# Patient Record
Sex: FEMALE | Race: BLACK OR AFRICAN AMERICAN
[De-identification: names, ages, dates, MRNs, and addresses within clinical notes are randomized per-mention and may not be internally consistent; named-entity substitution may affect disease eponyms.]

---

## 2022-12-03 ENCOUNTER — HOSPITAL ENCOUNTER (EMERGENCY)
Dept: HOSPITAL 97 - ER | Age: 67
LOS: 1 days | Discharge: HOME | End: 2022-12-04
Payer: COMMERCIAL

## 2022-12-03 DIAGNOSIS — K80.50: ICD-10-CM

## 2022-12-03 DIAGNOSIS — I10: ICD-10-CM

## 2022-12-03 DIAGNOSIS — K57.30: Primary | ICD-10-CM

## 2022-12-03 DIAGNOSIS — Z88.6: ICD-10-CM

## 2022-12-03 LAB
ALBUMIN SERPL BCP-MCNC: 3.5 G/DL (ref 3.4–5)
ALP SERPL-CCNC: 91 U/L (ref 45–117)
ALT SERPL W P-5'-P-CCNC: 23 U/L (ref 12–78)
AST SERPL W P-5'-P-CCNC: 19 U/L (ref 15–37)
BUN BLD-MCNC: 12 MG/DL (ref 7–18)
GLUCOSE SERPLBLD-MCNC: 135 MG/DL (ref 74–106)
HCT VFR BLD CALC: 35.1 % (ref 36–45)
LIPASE SERPL-CCNC: 48 U/L (ref 73–393)
LYMPHOCYTES # SPEC AUTO: 0.8 K/UL (ref 0.7–4.9)
MCV RBC: 80.5 FL (ref 80–100)
PMV BLD: 8.6 FL (ref 7.6–11.3)
POTASSIUM SERPL-SCNC: 3.4 MMOL/L (ref 3.5–5.1)
RBC # BLD: 4.36 M/UL (ref 3.86–4.86)
TROPONIN I SERPL HS-MCNC: 14.6 PG/ML (ref ?–58.9)
TSH SERPL DL<=0.05 MIU/L-ACNC: 0.37 UIU/ML (ref 0.36–3.74)

## 2022-12-03 PROCEDURE — 99284 EMERGENCY DEPT VISIT MOD MDM: CPT

## 2022-12-03 PROCEDURE — 86850 RBC ANTIBODY SCREEN: CPT

## 2022-12-03 PROCEDURE — 96374 THER/PROPH/DIAG INJ IV PUSH: CPT

## 2022-12-03 PROCEDURE — 86901 BLOOD TYPING SEROLOGIC RH(D): CPT

## 2022-12-03 PROCEDURE — 86900 BLOOD TYPING SEROLOGIC ABO: CPT

## 2022-12-03 PROCEDURE — 87040 BLOOD CULTURE FOR BACTERIA: CPT

## 2022-12-03 PROCEDURE — 96372 THER/PROPH/DIAG INJ SC/IM: CPT

## 2022-12-03 PROCEDURE — 83690 ASSAY OF LIPASE: CPT

## 2022-12-03 PROCEDURE — 84484 ASSAY OF TROPONIN QUANT: CPT

## 2022-12-03 PROCEDURE — 93005 ELECTROCARDIOGRAM TRACING: CPT

## 2022-12-03 PROCEDURE — 85025 COMPLETE CBC W/AUTO DIFF WBC: CPT

## 2022-12-03 PROCEDURE — 84443 ASSAY THYROID STIM HORMONE: CPT

## 2022-12-03 PROCEDURE — 80053 COMPREHEN METABOLIC PANEL: CPT

## 2022-12-03 PROCEDURE — 36415 COLL VENOUS BLD VENIPUNCTURE: CPT

## 2022-12-03 PROCEDURE — 96375 TX/PRO/DX INJ NEW DRUG ADDON: CPT

## 2022-12-03 PROCEDURE — 74177 CT ABD & PELVIS W/CONTRAST: CPT

## 2022-12-03 NOTE — XMS REPORT
Continuity of Care Document

                           Created on:December 3, 2022



Patient:ROLANDO FOSTER

Sex:Female

:1955

External Reference #:[id number]





Demographics







                          Address                   [address]



                                                    [place] [address]

 

                          Home Phone                [phone]

 

                          Work Phone                [phone]

 

                          Email Address             [email or web address]

 

                          Preferred Language        Unknown

 

                          Marital Status            Unknown

 

                          Islam Affiliation     Unknown

 

                          Race                      Unknown

 

                          Additional Race(s)        Unavailable



                                                    Unavailable

 

                          Ethnic Group              Unknown









Author







                          Organization              [facility]

t

 

                          Address                   [address]



                                                    [place] [address]

 

                          Phone                     [phone]









Support







                Name            Relationship    Address         Phone

 

                1               OLAMIDE          Unavailable     Unavailable

 

                2               Unavailable     Unavailable     Unavailable









Care Team Providers







                    Name                Role                Phone

 

                    ISABELLE CINTRON Attending Clinician Unavailable

 

                    TRACEE SOOD      Attending Clinician Unavailable

 

                    ROYA AMAYA    Attending Clinician Unavailable









Payers







           Payer Name Policy Type Policy Number Effective Date Expiration Date S

ourrosemarie

 

           [employer]            [id number]  2020            



           PPO                              00:00:00              

 

           [facility]            [id number]            



           [facility]                         00:00:00              







Problems

This patient has no known problems.



Allergies, Adverse Reactions, Alerts







       Allergy Allergy Status Severity Reaction(s) Onset  Inactive Treating Comm

ents 

Source



       Name   Type                        Date   Date   Clinician        

 

       [facility]   Active Med    N/V                          [facility]                      3-21                        ity of



                                          00:00:                      [address]







Medications

This patient has no known medications.



Procedures

This patient has no known procedures.



Encounters







        Start   End     Encounter Admission Attending Care    Care    Encounter 

Source



        Date/Time Date/Time Type    Type    Clinicians Facility Department ID   

   

 

        2021 Outpatient         NICHOLAS CINTRON  [id number]

[address]



        09:30:00 09:30:00                 ISABELLE mckeon

 

        2020 Outpatient R       BARRIE  [facility]    [id number]

[address]



        14:00:00 14:00:00                 TRACEE                         [facility]

 

        2020 Outpatient R       JOSE LUIS [facility]    [id number]

[address]



        10:20:00 10:20:00                 ROYA                          [facility]

 

        2020 Outpatient R               [facility]    [id number]

[address]



        17:00:00 17:00:00                                                 [facility]

 

        2020 2020 Outpatient R               [facility]    [id number]

[address]



        09:10:00 09:10:00                                                 [facility]







Results

This patient has no known results.

## 2022-12-04 VITALS — SYSTOLIC BLOOD PRESSURE: 147 MMHG | OXYGEN SATURATION: 98 % | DIASTOLIC BLOOD PRESSURE: 69 MMHG

## 2022-12-04 VITALS — TEMPERATURE: 98.6 F

## 2022-12-04 NOTE — ER
Nurse's Notes                                                                                     

 [facility]                                                                 

Name: Shawnee Hall                                                                             

Age: 67 yrs                                                                                       

Sex: Female                                                                                       

: 1955                                                                                   

MRN: [id number]                                                                                   

Arrival Date: 2022                                                                          

Time: 21:23                                                                                       

Account#: [id number]                                                                            

Bed 8                                                                                             

Private MD:                                                                                       

Diagnosis: Diverticulosis of large intestine without perforation or abscess without               

  bleeding;Biliary colic, abdominal pain                                                          

                                                                                                  

Presentation:                                                                                     

                                                                                             

21:31 Chief complaint: Patient states: "I have been throwing up all day, and I have been      tw5 

      having some chest pressure. The pressure started at 5:30 PM, then it went away but then     

      started to come back so that is when I decided to come up here.". Coronavirus screen:       

      Vaccine status: Patient reports receiving the 2nd dose of the covid vaccine. Pfizer.        

      Ebola Screen: Patient negative for fever greater than or equal to 101.5 degrees             

      Fahrenheit, and additional compatible Ebola Virus Disease symptoms Patient denies           

      exposure to infectious person. Patient denies travel to an Ebola-affected area in the       

      21 days before illness onset. Initial Sepsis Screen: Does the patient meet any 2            

      criteria? No. Patient's initial sepsis screen is negative. Does the patient have a          

      suspected source of infection? No. Patient's initial sepsis screen is negative. Risk        

      Assessment: Do you want to hurt yourself or someone else? Patient reports no desire to      

      harm self or others. Onset of symptoms was 2022 at 17:30.                      

21:31 Method Of Arrival: Ambulatory                                                           tw5 

21:31 Acuity: JASON 2                                                                           tw5 

                                                                                                  

Triage Assessment:                                                                                

21:33 General: Appears uncomfortable, Behavior is calm, cooperative, appropriate for age.     tw5 

      Pain: Complains of pain in chest Pain currently is 7 out of 10 on a pain scale.             

                                                                                                  

Historical:                                                                                       

- Allergies:                                                                                      

21:33 Ibuprofen;                                                                              tw5 

- Home Meds:                                                                                      

21:33 losartan-hydrochlorothiazide 100-25 mg oral tab 1 tab once daily [Active];              tw5 

- PMHx:                                                                                           

21:33 Hypertensive disorder;                                                                  tw5 

- PSHx:                                                                                           

21:33 None;                                                                                   tw5 

                                                                                                  

- Immunization history:: Flu vaccine is up to date.                                               

- Social history:: Smoking status: Patient denies any tobacco usage or history of.                

                                                                                                  

                                                                                                  

Screenin/04                                                                                             

00:34 Abuse screen: Denies threats or abuse. Denies injuries from another. Nutritional        as6 

      screening: No deficits noted. Tuberculosis screening: No symptoms or risk factors           

      identified. Fall Risk None identified.                                                      

                                                                                                  

Assessment:                                                                                       

                                                                                             

22:00 General: Appears in no apparent distress. Behavior is calm, cooperative. Pain:          as6 

      Complains of pain in left upper quadrant and right upper quadrant and chest. Neuro:         

      Level of Consciousness is awake, alert, obeys commands, Oriented to person, place,          

      time, situation. Cardiovascular: Reports chest pain, palpitations. Respiratory:             

      Respiratory effort is even, unlabored. GI: Abdomen is distended.                            

22:00 GI: Reports nausea.                                                                     as6 

                                                                                             

00:16 Reassessment: Patient and/or family updated on plan of care and expected duration. Pain ll3 

      level reassessed. Patient is alert, oriented x 3, equal unlabored respirations, skin        

      warm/dry/pink. States pain is 4/10 Patient states feeling better. Patient states            

      symptoms have improved.                                                                     

                                                                                                  

Vital Signs:                                                                                      

                                                                                             

21:31  / 91; Pulse 101; Resp 18; Temp 98.6; Pulse Ox 100% on R/A; Weight 78.02 kg;      tw5 

      Height 5 ft. 1 in. (154.94 cm); Pain 7/10;                                                  

22:04  / 89; Pulse 98; Resp 29 S; Pulse Ox 100% on R/A;                                 as6 

22:44  / 62; Pulse 93; Resp 16; Pulse Ox 100% on R/A;                                   ll3 

12/04                                                                                             

00:16  / 69; Pulse 79; Resp 18; Pulse Ox 98% on R/A; Pain 4/10;                         ll3 

                                                                                             

21:31 Body Mass Index 32.50 (78.02 kg, 154.94 cm)                                             tw5 

                                                                                                  

ED Course:                                                                                        

                                                                                             

21:23 Patient arrived in ED.                                                                  ja2 

21:24 Joann Ruiz FNP-C is PHCP.                                                          snw 

21:24 Tomy Grady DO is Attending Physician.                                                snw 

21:33 Triage completed.                                                                       tw5 

21:33 Arm band placed on Patient placed in an exam room.                                      tw5 

21:53 Gerardo Lyn, RN is Primary Nurse.                                                    as6 

22:10 Inserted saline lock: 20 gauge in left antecubital area, using aseptic technique. Blood zm  

      collected.                                                                                  

22:11 CBC with Diff Sent.                                                                     zm  

22:11 CMP Sent.                                                                               zm  

22:11 Lipase Sent.                                                                            zm  

22:12 TSH Sent.                                                                               zm  

22:12 TS Sent.                                                                                zm  

23:47 CT Abd/Pelvis - IV Contrast Only In Process Unspecified.                                EDMS

                                                                                             

00:34 Placed in gown. Bed in low position. Call light in reach. Side rails up X2.             as6 

00:34 No provider procedures requiring assistance completed.                                  as6 

00:59 IV discontinued, intact, bleeding controlled, No redness/swelling at site. Pressure     ll3 

      dressing applied.                                                                           

                                                                                                  

Administered Medications:                                                                         

                                                                                             

22:20 Drug: Pepcid (famotidine) 20 mg Route: IVP; Site: left antecubital;                     ll3 

                                                                                             

00:16 Follow up: Response: No adverse reaction                                                ll3 

                                                                                             

22:20 Drug: NS 0.9% 1000 ml Route: IV; Rate: 125 ml/hr; Site: left antecubital;               ll3 

22:20 Drug: Phenergan (promethazine) 25 mg Route: IM; Site: right gluteus;                    ll3 

                                                                                             

00:16 Follow up: Response: No adverse reaction; Marked relief of symptoms                     ll3 

                                                                                             

22:39 Drug: fentaNYL (PF) 50 mcg Route: IVP; Site: left antecubital;                          ll3 

                                                                                             

00:16 Follow up: Response: No adverse reaction; Marked relief of symptoms                     ll3 

00:53 Drug: Augmentin (Amoxicillin-Clavulanate) 875 mg Route: PO;                             ll3 

                                                                                                  

                                                                                                  

Medication:                                                                                       

00:34 VIS not applicable for this client.                                                     as6 

                                                                                                  

Outcome:                                                                                          

00:32 Discharge ordered by MD.                                                                [email or web address] 

00:34 Condition: stable                                                                       as6 

00:59 Discharged to home ambulatory, with significant other.                                  ll3 

00:59 Condition: stable                                                                           

00:59 Discharge instructions given to patient, significant other, Instructed on discharge         

      instructions, follow up and referral plans. medication usage, Demonstrated                  

      understanding of instructions, follow-up care, medications, Prescriptions given X 3.        

01:00 Patient left the ED.                                                                    ll3 

                                                                                                  

Signatures:                                                                                       

Dispatcher [employer]                           EDMS                                                 

Joann Ruiz FNP-C                   FNP-Csnw                                                  

Jen Stein Tiffany                                tw5                                                  

Gerardo Lyn RN RN   as6                                                  

Jennie Nevarez RN RN   ll3                                                  

Marta Damico                                                   

                                                                                                  

Corrections: (The following items were deleted from the chart)                                    

12/03                                                                                             

21:34 21:33 Allergies: Codeine; tw5                                                           tw5 

                                                                                                  

**************************************************************************************************

## 2022-12-04 NOTE — EDPHYS
Physician Documentation                                                                           

 [facility]                                                                 

Name: Shawnee Hall                                                                             

Age: 67 yrs                                                                                       

Sex: Female                                                                                       

: 1955                                                                                   

MRN: [id number]                                                                                   

Arrival Date: 2022                                                                          

Time: 21:23                                                                                       

Account#: [id number]                                                                            

Bed 8                                                                                             

Private MD:                                                                                       

ED Physician Tomy Grady                                                                         

HPI:                                                                                              

                                                                                             

21:52 This 67 yrs old Black Female presents to ER via Ambulatory with complaints of Chest     snw 

      Pressure, Vomiting, Abdominal Pain.                                                         

21:52 The patient presents with abdominal pain in the epigastric area, in the upper abdomen,  snw 

      abdominal distention in the epigastric area, in the upper abdomen. Onset: The               

      symptoms/episode began/occurred suddenly, this morning. The symptoms do not radiate.        

      Associated signs and symptoms: Pertinent positives: nausea and vomiting. The symptoms       

      are described as constant, steady. Severity of pain: At its worst the pain was              

      moderate. The patient has not experienced similar symptoms in the past. The patient has     

      not recently seen a physician.                                                              

                                                                                                  

Historical:                                                                                       

- Allergies:                                                                                      

21:33 Ibuprofen;                                                                              tw5 

- Home Meds:                                                                                      

21:33 losartan-hydrochlorothiazide 100-25 mg oral tab 1 tab once daily [Active];              tw 

- PMHx:                                                                                           

21:33 Hypertensive disorder;                                                                   

- PSHx:                                                                                           

21:33 None;                                                                                   tw 

                                                                                                  

- Immunization history:: Flu vaccine is up to date.                                               

- Social history:: Smoking status: Patient denies any tobacco usage or history of.                

                                                                                                  

                                                                                                  

ROS:                                                                                              

21:51 Constitutional: Negative for fever, chills, and weight loss, Eyes: Negative for injury, snw 

      pain, redness, and discharge, ENT: Negative for injury, pain, and discharge, Neck:          

      Negative for injury, pain, and swelling.                                                    

21:51 Back: Negative for injury and pain, : Negative for injury, bleeding, discharge, and       

      swelling, MS/Extremity: Negative for injury and deformity, Skin: Negative for injury,       

      rash, and discoloration, Neuro: Negative for headache, weakness, numbness, tingling,        

      and seizure, Psych: Negative for depression, anxiety, suicide ideation, homicidal           

      ideation, and hallucinations.                                                               

21:51 Cardiovascular: Positive for chest pain.                                                    

21:51 Respiratory: Negative for cough, shortness of breath, sputum production.                    

21:51 Abdomen/GI: Positive for abdominal pain, nausea and vomiting, Negative for diarrhea.        

                                                                                                  

Exam:                                                                                             

21:50 Constitutional:  This is a well developed, well nourished patient who is awake, alert,  snw 

      and in no acute distress. Head/Face:  Normocephalic, atraumatic. Eyes:  Pupils equal        

      round and reactive to light, extra-ocular motions intact.  Lids and lashes normal.          

      Conjunctiva and sclera are non-icteric and not injected.  Cornea within normal limits.      

      Periorbital areas with no swelling, redness, or edema. ENT:  Nares patent. No nasal         

      discharge, no septal abnormalities noted.  Tympanic membranes are normal and external       

      auditory canals are clear.  Oropharynx with no redness, swelling, or masses, exudates,      

      or evidence of obstruction, uvula midline.  Mucous membranes moist. Neck:  Trachea          

      midline, no thyromegaly or masses palpated, and no cervical lymphadenopathy.  Supple,       

      full range of motion without nuchal rigidity, or vertebral point tenderness.  No            

      Meningismus. Chest/axilla:  Normal chest wall appearance and motion.  Nontender with no     

      deformity.  No lesions are appreciated.                                                     

21:50 Respiratory:  Lungs have equal breath sounds bilaterally, clear to auscultation and         

      percussion.  No rales, rhonchi or wheezes noted.  No increased work of breathing, no        

      retractions or nasal flaring.                                                               

21:50 Back:  No spinal tenderness.  No costovertebral tenderness.  Full range of motion. MS/      

      Extremity:  Pulses equal, no cyanosis.  Neurovascular intact.  Full, normal range of        

      motion. Neuro:  Awake and alert, GCS 15, oriented to person, place, time, and               

      situation.  Cranial nerves II-XII grossly intact.  Motor strength 5/5 in all                

      extremities.  Sensory grossly intact.  Cerebellar exam normal.  Normal gait. Psych:         

      Awake, alert, with orientation to person, place and time.  Behavior, mood, and affect       

      are within normal limits.                                                                   

21:50 Cardiovascular: Rhythm: regular, with PVC, Heart sounds: murmur, systolic, Edema: is        

      not appreciated.                                                                            

21:50 Abdomen/GI: Inspection: distension, that is mild, that is moderate, Bowel sounds:           

      normal, Palpation: mild abdominal tenderness, in the right upper quadrant and left          

      upper quadrant.                                                                             

21:50 Skin: Appearance: Color: pale.                                                              

                                                                                                  

Vital Signs:                                                                                      

21:31  / 91; Pulse 101; Resp 18; Temp 98.6; Pulse Ox 100% on R/A; Weight 78.02 kg;      tw5 

      Height 5 ft. 1 in. (154.94 cm); Pain 7/10;                                                  

22:04  / 89; Pulse 98; Resp 29 S; Pulse Ox 100% on R/A;                                 as6 

22:44  / 62; Pulse 93; Resp 16; Pulse Ox 100% on R/A;                                   ll3 

                                                                                             

00:16  / 69; Pulse 79; Resp 18; Pulse Ox 98% on R/A; Pain 4/10;                         ll3 

                                                                                             

21:31 Body Mass Index 32.50 (78.02 kg, 154.94 cm)                                             tw5 

                                                                                                  

MDM:                                                                                              

                                                                                             

21:28 Patient medically screened.                                                             snw 

                                                                                             

00:34 Data reviewed: vital signs, nurses notes. Data interpreted: Pulse oximetry: on room air snw 

      is 98 %. Interpretation: normal. Counseling: I had a detailed discussion with the           

      patient and/or guardian regarding: the historical points, exam findings, and any            

      diagnostic results supporting the discharge/admit diagnosis, the presence of at least       

      one elevated blood pressure reading (>120/80) during this emergency department visit,       

      lab results, radiology results. Response to treatment: the patient's symptoms have          

      mildly improved after treatment. Special discussion: Based on the patient's Hx, exam,       

      and Dx evaluation, there is no indication for emergent surgery or inpatient Tx. It is       

      understood by the patient/guardian that if the Sx's persist or worsen they need to          

      return immediately for re-evaluation. Based on the history and exam findings, there is      

      no indication for further emergent testing or inpatient evaluation. I discussed with        

      the patient/guardian the need to see the gastroenterologist for further evaluation of       

      the symptoms. CT results discussed with Ms. Hall with findings concerning for colon     

      cancer. Pt must f/u with GI for colonoscopy/biopsy/tx. Pt voices understanding..            

                                                                                                  

                                                                                             

21:47 Order name: TS; Complete Time: 23:27                                                    snw 

                                                                                             

21:47 Order name: TSH; Complete Time: 22:41                                                   snw 

                                                                                             

21:47 Order name: CBC with Diff; Complete Time: 22:17                                         snw 

                                                                                             

21:47 Order name: CMP; Complete Time: 22:41                                                   snw 

                                                                                             

21:47 Order name: Lipase; Complete Time: 22:41                                                snw 

                                                                                             

21:47 Order name: Troponin High Sensitivity; Complete Time: 22:41                             snw 

                                                                                             

21:54 Order name: CT Abd/Pelvis - IV Contrast Only                                            snw 

                                                                                             

22:41 Order name: Blood Culture Adult (2)                                                     snw 

                                                                                             

23:29 Order name: ABO/RH no charge; Complete Time: 23:30                                      EDMS

                                                                                             

21:47 Order name: IV Saline Lock; Complete Time: 22:11                                        snw 

                                                                                             

21:47 Order name: Labs collected and sent; Complete Time: 22:11                               snw 

                                                                                                  

EC/03                                                                                             

21:54 Rate is 100 beats/min. Rhythm is regular. QRS Axis is Normal. [place] interval is normal.    snw 

      QRS interval is normal. Clinical impression: NSR w/ Non-specific ST/T Changes.              

                                                                                                  

Administered Medications:                                                                         

22:20 Drug: Pepcid (famotidine) 20 mg Route: IVP; Site: left antecubital;                     ll3 

                                                                                             

00:16 Follow up: Response: No adverse reaction                                                3 

                                                                                             

22:20 Drug: NS 0.9% 1000 ml Route: IV; Rate: 125 ml/hr; Site: left antecubital;               3 

22:20 Drug: Phenergan (promethazine) 25 mg Route: IM; Site: right gluteus;                    ll3 

                                                                                             

00:16 Follow up: Response: No adverse reaction; Marked relief of symptoms                     ll3 

                                                                                             

22:39 Drug: fentaNYL (PF) 50 mcg Route: IVP; Site: left antecubital;                          ll3 

                                                                                             

00:16 Follow up: Response: No adverse reaction; Marked relief of symptoms                     ll3 

00:53 Drug: Augmentin (Amoxicillin-Clavulanate) 875 mg Route: PO;                             ll3 

                                                                                                  

                                                                                                  

Disposition:                                                                                      

                                                                                             

22:39 Co-signature as Attending Physician, Tomy MENDEZ was immediately available on-site ms3 

      in the Emergency Department for consultation in the care of the patient.                    

                                                                                                  

Disposition Summary:                                                                              

22 00:32                                                                                    

Discharge Ordered                                                                                 

      Location: Home                                                                          snw 

      Condition: Stable                                                                       snw 

      Diagnosis                                                                                   

        - Diverticulosis of large intestine without perforation or abscess without bleeding   snw 

        - Biliary colic, abdominal pain                                                       snw 

      Followup:                                                                               snw 

        - With: Emergency Department                                                               

        - When: As needed                                                                          

        - Reason: Worsening of condition                                                           

      Followup:                                                                               snw 

        - With: Private Physician                                                                  

        - When: 1 - 2 days                                                                         

        - Reason: Recheck today's complaints, Continuance of care, Re-evaluation by your           

      physician                                                                                   

      Discharge Instructions:                                                                     

        - Discharge Summary Sheet                                                             snw 

        - Biliary Colic, Adult                                                                snw 

        - High-Fiber Diet                                                                     snw 

        - Diverticulosis                                                                      snw 

        - Colon Mass, Adult                                                                   snw 

      Forms:                                                                                      

        - Medication Reconciliation Form                                                      snw 

        - Thank You Letter                                                                    snw 

        - Antibiotic Education                                                                snw 

        - Prescription Opioid Use                                                             snw 

      Prescriptions:                                                                              

        - Augmentin 875-125 mg Oral Tablet                                                         

            - take 1 tablet by ORAL route every 12 hours for 10 days; 20 tablet; Refills: 0,  snw 

      Product Selection Permitted                                                                 

        - Tramadol 50 mg Oral Tablet                                                               

            - take 1 tablet by ORAL route every 8 hours as needed; 12 tablet; Refills: 0,     snw 

      Product Selection Permitted                                                                 

        - promethazine 25 mg Oral Tablet                                                           

            - take 1 tablet by ORAL route every 6 hours As needed; 20 tablet; Refills: 0,     snw 

      Product Selection Permitted                                                                 

Signatures:                                                                                       

Dispatcher [employer]                           EDMS                                                 

Joann Ruiz, TEJA-C                   FNP-[employer]                                                  

Tomy Grady DO                        DO   ms3                                                  

Jodi Clancy                                tw5                                                  

Jennie Nevarez RN                      RN   ll3                                                  

                                                                                                  

Corrections: (The following items were deleted from the chart)                                    

21:34 21:33 Allergies: Codeine; tw5                                                           tw5 

                                                                                                  

**************************************************************************************************

## 2022-12-05 NOTE — RAD REPORT
EXAM DESCRIPTION:  CT - Abdomen   Pelvis W Contrast - 12/4/2022 6:57 am

 

 

CLINICAL HISTORY:  The patient is 67 years old and is Female; Abdominal pain, acute, nonlocalized

 

TECHNIQUE:  Axial computed tomography images of the abdomen and pelvis with intravenous contrast.   S
agittal and coronal reformatted images were created and reviewed.   This CT exam was performed using 
one or more of the following dose reduction techniques:   automated exposure control, adjustment of t
he mA and/or kV according to patient size, and/or use of iterative reconstruction technique.

DLP: 1891 mGy*cm

 

COMPARISON:  None.

 

FINDINGS:  LUNG BASES:   Bibasilar atelectasis or scarring. No focal consolidation.

MEDIASTINUM:   Small hiatal hernia.

ABDOMEN:

LIVER:   Hepatic steatosis.

GALLBLADDER AND BILE DUCTS:   Cholelithiasis and fluid distention of the gallbladder. No pericholecys
tic fluid.

        No ductal dilation.

PANCREAS:   Unremarkable.   No mass.   No ductal dilation.

SPLEEN:   Unremarkable.   No splenomegaly.

ADRENALS:   Unremarkable.   No mass.

KIDNEYS AND URETERS:   Multiple bilateral renal cysts measuring up to 3.8 cm on the left.

        No hydronephrosis.

STOMACH AND BOWEL:   Circumferential wall thickening of the distal descending colon with regional lum
inal narrowing and prominence of the mesentery. Upstream thecal dilatation of the descending colon wi
th scattered diverticula and wall thickening.

PELVIS:

APPENDIX:   The appendix is seen and is within normal limits.

BLADDER:   Unremarkable.   No mass.

REPRODUCTIVE:   Unremarkable as visualized.

ABDOMEN and PELVIS:

INTRAPERITONEAL SPACE:   Unremarkable.   No free air.   No significant fluid collection.

BONES/JOINTS:   No acute fracture.   No dislocation.

SOFT TISSUES:   Unremarkable.

VASCULATURE:   Unremarkable.   No abdominal aortic aneurysm.

LYMPH NODES:   Unremarkable.   No enlarged lymph nodes.

 

IMPRESSION:  1.   Circumferential wall thickening of the distal descending colon with regional lumina
l narrowing and prominence of the mesentery. Upstream fecal dilatation of the descending colon with s
cattered diverticula and wall thickening.   Finding could be secondary to focal diverticulitis. Howev
er, mucosal neoplastic (Apple core) lesion cannot be excluded. No perforation or abscess formation. C
olonoscopy is recommended when clinically feasible.

2.   Cholelithiasis and fluid distention of the gallbladder. No pericholecystic fluid.

3.   Small hiatal hernia.

4.   Hepatic steatosis.

 

Electronically signed by:   Gage Bojorquez DO   12/4/2022 12:02 AM CST Workstation: [id number]

 

 

Due to temporary technical issues with the PACS/Fluency reporting system, reports are being signed by
 the in house radiologists without review as a courtesy to insure prompt reporting. The interpreting 
radiologist is fully responsible for the content of the report.

## 2022-12-05 NOTE — EKG
Test Date:    2022-12-03               Test Time:    21:47:32

Technician:   AS                                     

                                                     

MEASUREMENT RESULTS:                                       

Intervals:                                           

Rate:         100                                    

TN:           130                                    

QRSD:         86                                     

QT:           374                                    

QTc:          482                                    

Axis:                                                

P:            56                                     

TN:           130                                    

QRS:          24                                     

T:            19                                     

                                                     

INTERPRETIVE STATEMENTS:                                       

                                                     

Sinus rhythm with premature atrial complexes

Right atrial enlargement

Borderline ECG

Compared to ECG 10/21/1996 14:52:00

Atrial premature complex(es) now present

Atrial abnormality now present



Electronically Signed On 12-05-22 13:47:19 CST by Mick Travis

## 2022-12-17 ENCOUNTER — HOSPITAL ENCOUNTER (INPATIENT)
Dept: HOSPITAL 97 - ER | Age: 67
LOS: 5 days | Discharge: HOME | DRG: 445 | End: 2022-12-22
Attending: INTERNAL MEDICINE | Admitting: HOSPITALIST
Payer: COMMERCIAL

## 2022-12-17 VITALS — BODY MASS INDEX: 31.4 KG/M2

## 2022-12-17 DIAGNOSIS — K44.9: ICD-10-CM

## 2022-12-17 DIAGNOSIS — N28.1: ICD-10-CM

## 2022-12-17 DIAGNOSIS — K57.92: ICD-10-CM

## 2022-12-17 DIAGNOSIS — K63.9: ICD-10-CM

## 2022-12-17 DIAGNOSIS — Z88.8: ICD-10-CM

## 2022-12-17 DIAGNOSIS — C18.7: ICD-10-CM

## 2022-12-17 DIAGNOSIS — I16.0: ICD-10-CM

## 2022-12-17 DIAGNOSIS — K80.20: Primary | ICD-10-CM

## 2022-12-17 DIAGNOSIS — K29.70: ICD-10-CM

## 2022-12-17 DIAGNOSIS — I10: ICD-10-CM

## 2022-12-17 DIAGNOSIS — D72.829: ICD-10-CM

## 2022-12-17 DIAGNOSIS — Z79.899: ICD-10-CM

## 2022-12-17 DIAGNOSIS — Z20.822: ICD-10-CM

## 2022-12-17 LAB
ALBUMIN SERPL BCP-MCNC: 3.3 G/DL (ref 3.4–5)
ALP SERPL-CCNC: 93 U/L (ref 45–117)
ALT SERPL W P-5'-P-CCNC: 20 U/L (ref 13–56)
AST SERPL W P-5'-P-CCNC: 18 U/L (ref 15–37)
BUN BLD-MCNC: 12 MG/DL (ref 7–18)
GLUCOSE SERPLBLD-MCNC: 123 MG/DL (ref 74–106)
HCT VFR BLD CALC: 37.1 % (ref 36–45)
LIPASE SERPL-CCNC: 60 U/L (ref 73–393)
LYMPHOCYTES # SPEC AUTO: 0.8 K/UL (ref 0.7–4.9)
MCV RBC: 80.4 FL (ref 80–100)
PMV BLD: 8.5 FL (ref 7.6–11.3)
POTASSIUM SERPL-SCNC: 3.7 MMOL/L (ref 3.5–5.1)
RBC # BLD: 4.61 M/UL (ref 3.86–4.86)

## 2022-12-17 PROCEDURE — 80048 BASIC METABOLIC PNL TOTAL CA: CPT

## 2022-12-17 PROCEDURE — 88305 TISSUE EXAM BY PATHOLOGIST: CPT

## 2022-12-17 PROCEDURE — 78227 HEPATOBIL SYST IMAGE W/DRUG: CPT

## 2022-12-17 PROCEDURE — 84132 ASSAY OF SERUM POTASSIUM: CPT

## 2022-12-17 PROCEDURE — 76705 ECHO EXAM OF ABDOMEN: CPT

## 2022-12-17 PROCEDURE — 85025 COMPLETE CBC W/AUTO DIFF WBC: CPT

## 2022-12-17 PROCEDURE — 96372 THER/PROPH/DIAG INJ SC/IM: CPT

## 2022-12-17 PROCEDURE — 83735 ASSAY OF MAGNESIUM: CPT

## 2022-12-17 PROCEDURE — 96375 TX/PRO/DX INJ NEW DRUG ADDON: CPT

## 2022-12-17 PROCEDURE — 87811 SARS-COV-2 COVID19 W/OPTIC: CPT

## 2022-12-17 PROCEDURE — 96374 THER/PROPH/DIAG INJ IV PUSH: CPT

## 2022-12-17 PROCEDURE — 74177 CT ABD & PELVIS W/CONTRAST: CPT

## 2022-12-17 PROCEDURE — 93005 ELECTROCARDIOGRAM TRACING: CPT

## 2022-12-17 PROCEDURE — 83690 ASSAY OF LIPASE: CPT

## 2022-12-17 PROCEDURE — 82565 ASSAY OF CREATININE: CPT

## 2022-12-17 PROCEDURE — 84100 ASSAY OF PHOSPHORUS: CPT

## 2022-12-17 PROCEDURE — 84484 ASSAY OF TROPONIN QUANT: CPT

## 2022-12-17 PROCEDURE — 88312 SPECIAL STAINS GROUP 1: CPT

## 2022-12-17 PROCEDURE — 80053 COMPREHEN METABOLIC PANEL: CPT

## 2022-12-17 PROCEDURE — 96361 HYDRATE IV INFUSION ADD-ON: CPT

## 2022-12-17 PROCEDURE — 99285 EMERGENCY DEPT VISIT HI MDM: CPT

## 2022-12-17 PROCEDURE — 36415 COLL VENOUS BLD VENIPUNCTURE: CPT

## 2022-12-17 PROCEDURE — 81003 URINALYSIS AUTO W/O SCOPE: CPT

## 2022-12-17 NOTE — ER
Nurse's Notes                                                                                     

 [facility]                                                                 

Name: Shawnee Hall                                                                             

Age: 67 yrs                                                                                       

Sex: Female                                                                                       

: 1955                                                                                   

MRN: [id number]                                                                                   

Arrival Date: 2022                                                                          

Time: 15:47                                                                                       

Account#: [id number]                                                                            

Bed 7                                                                                             

Private MD:                                                                                       

Diagnosis: Diverticulitis of intestine, part unspecified, without perforation or abscess without  

  bleeding;Nausea with vomiting, unspecified;Elevated white blood cell count                      

                                                                                                  

Presentation:                                                                                     

                                                                                             

16:01 Chief complaint: Patient states: N/V x 1 day, took phenergan this morning w/ no relief, ph  

      also reports upper abdominal pain. Coronavirus screen: Vaccine status: Patient reports      

      receiving the 2nd dose of the covid vaccine. Ebola Screen: No symptoms or risks             

      identified at this time. Initial Sepsis Screen: Does the patient meet any 2 criteria?       

      No. Patient's initial sepsis screen is negative. Does the patient have a suspected          

      source of infection? No. Patient's initial sepsis screen is negative. Risk Assessment:      

      Do you want to hurt yourself or someone else? Patient reports no desire to harm self or     

      others. Onset of symptoms was 2022.                                            

16:01 Method Of Arrival: Ambulatory                                                           ph  

16:01 Acuity: JASON 3                                                                           ph  

                                                                                                  

Historical:                                                                                       

- Allergies:                                                                                      

16:05 Ibuprofen;                                                                              ph  

- Home Meds:                                                                                      

16:05 losartan-hydrochlorothiazide 100-25 mg Oral tab 1 tab once daily [Active];              ph  

- PMHx:                                                                                           

16:05 Hypertensive disorder;                                                                  ph  

                                                                                                  

- Immunization history:: Adult Immunizations unknown.                                             

- Social history:: Smoking status: Patient denies any tobacco usage or history of.                

                                                                                                  

                                                                                                  

Screenin:47 Abuse screen: Denies threats or abuse. Denies injuries from another. Nutritional        db  

      screening: No deficits noted. Tuberculosis screening: No symptoms or risk factors           

      identified. Fall Risk No fall in past 12 months (0 pts). No secondary diagnosis (0          

      pts). IV access (20 points). Ambulatory Aid- Gait- Normal/Bed Rest/Wheelchair (0 pts)       

      Mental Status- Oriented to own ability (0 pts). Total Conner Fall Scale indicates No         

      Risk (0-24 pts).                                                                            

19:09 [facility] ED Fall Risk Assessment (Adult) History of falling in the last 3 months,       db  

      including since admission No falls in past 3 months (0 pts) Confusion or Disorientation     

      No (0 pts) Intoxicated or Sedated No (0 pts) Impaired Gait No (0 pts) Mobility Assist       

      Device Used No (0 pt) Altered Elimination No (0 pt) Score/Fall Risk Level 0 - 2 = Low       

      Risk.                                                                                       

                                                                                                  

Assessment:                                                                                       

16:15 Reassessment: Patient appears in no apparent distress at this time. Patient is alert,   db  

      oriented x 3, equal unlabored respirations, skin warm/dry/pink. patient states has          

      nausea and abdominal discomfort started today. states vomited BP medications. General:      

      Appears in no apparent distress. comfortable, Behavior is calm, cooperative,                

      appropriate for age. Pain: Complains of pain in abdomen. Neuro: No deficits noted.          

      Level of Consciousness is awake, alert, obeys commands, Oriented to person, place,          

      time, situation, Appropriate for age Speech is normal. Cardiovascular: No deficits          

      noted. Respiratory: No deficits noted. GI: Abdomen is flat, non-distended, Pt is            

      actively vomiting dry heaving Abd is soft. : No deficits noted. No signs and/or           

      symptoms were reported regarding the genitourinary system.                                  

18:15 Reassessment: Patient appears in no apparent distress at this time. No changes from     kc6 

      previously documented assessment. Patient and/or family updated on plan of care and         

      expected duration. Pain level reassessed. Patient is alert, oriented x 3, equal             

      unlabored respirations, skin warm/dry/pink.                                                 

19:51 General: Appears in no apparent distress. comfortable, Behavior is calm, cooperative,   aa9 

      appropriate for age. Pain: Denies pain. Neuro: Level of Consciousness is awake, alert,      

      obeys commands, Oriented to person, place, time, situation. Respiratory: Airway is          

      patent Respiratory effort is even, unlabored.                                               

                                                                                                  

Vital Signs:                                                                                      

16:01 Pulse 89; Resp 18; Temp 98.2; Pulse Ox 100% on R/A; Weight 80.29 kg; Height 5 ft. 1 in. ph  

      (154.94 cm);                                                                                

16:04  / 74;                                                                            ph  

16:32  / 66; Pulse 90; Resp 16; Pulse Ox 99% on R/A;                                    db  

18:15  / 83; Pulse 95; Resp 16 S; Pulse Ox 100% ; Pain 9/10;                            kc6 

19:51  / 90; Pulse 94; Resp 19 S; Pulse Ox 99% ;                                        aa9 

23:17  / 78; Pulse 98; Resp 16; Pulse Ox 98% on R/A;                                    ll3 

16:01 Body Mass Index 33.44 (80.29 kg, 154.94 cm)                                             ph  

16:04 threw up BP meds this morning                                                           ph  

                                                                                                  

ED Course:                                                                                        

15:47 Patient arrived in ED.                                                                  rg4 

15:49 [facility]                                                        kb  

15:49 Manny Gutierrez MD is Attending Physician.                                                kb  

16:04 Triage completed.                                                                       ph  

16:06 Arm band placed on Patient placed in an exam room.                                      ph  

16:11 Robina Valle, RN is Primary Nurse.                                                  db  

16:30 Inserted saline lock: 20 gauge in right antecubital area, using aseptic technique.      db  

      Blood collected.                                                                            

18:01 CT Abd/Pelvis - IV Contrast Only In Process Unspecified.                                EDMS

19:09 Patient has correct armband on for positive identification. Bed in low position. Call   db  

      light in reach. Side rails up X2. Pulse ox on. NIBP on.                                     

19:09 Report given to Night shift RN.                                                         db  

21:20 Michael Godinez is Hospitalizing Provider.                                               kb  

21:49 Primary Nurse role handed off by Robina Valle, RN                                   wm  

23:50 No provider procedures requiring assistance completed. IV discontinued, intact,         aa9 

      bleeding controlled, No redness/swelling at site. Pressure dressing applied.                

                                                                                                  

Administered Medications:                                                                         

16:30 Drug: NS 0.9% 1000 ml Route: IV; Rate: 1 bolus; Site: right antecubital;                db  

19:08 Follow up: Response: No adverse reaction; IV Status: Completed infusion; IV Intake:     db  

      500ml                                                                                       

16:30 Drug: Pepcid (famotidine) 20 mg Route: IVP; Site: right antecubital;                    db  

18:37 Follow up: Response: No adverse reaction                                                db  

16:30 Drug: Zofran (Ondansetron) 4 mg Route: IVP; Site: right antecubital;                    db  

18:37 Follow up: Response: No adverse reaction                                                db  

18:14 Drug: Phenergan (promethazine) 25 mg Route: IM; Site: right deltoid;                    kc6 

19:08 Follow up: Response: No adverse reaction                                                db  

19:08 Follow up: Response: Nausea is decreased                                                db  

18:14 Drug: morphine 2 mg Route: IVP; Infused Over: 4 mins; Site: right antecubital;          kc6 

19:08 Follow up: Response: No adverse reaction                                                db  

19:32 Drug: Losartan 100 mg Route: PO;                                                        aa9 

23:52 Follow up: Response: No adverse reaction                                                aa9 

19:38 Drug: Hydrochlorothiazide 25 mg Route: PO;                                              aa9 

23:52 Follow up: Response: No adverse reaction                                                aa9 

19:38 Drug: Flagyl (metroNIDAZOLE) 500 mg Route: PO;                                          aa9 

23:52 Follow up: Response: No adverse reaction                                                aa9 

19:38 Drug: Cipro (ciprofloxacin) 500 mg Route: PO;                                           aa9 

23:52 Follow up: Response: No adverse reaction                                                aa9 

19:38 Drug: Zofran (Ondansetron) 4 mg Route: IVP; Site: right antecubital;                    aa9 

23:52 Follow up: Response: No adverse reaction                                                aa9 

20:53 Not Given (Patient Refused): fentaNYL (PF) 25 mcg IVP once                              ll3 

21:30 Drug: Ambien (zolpidem) 10 mg Route: PO;                                                ll3 

23:51 Follow up: Response: No adverse reaction; Marked relief of symptoms                     aa9 

22:18 Drug: Reglan (metoCLOPramide) 10 mg Route: IVP; Site: right antecubital;                ll3 

23:51 Follow up: Response: No adverse reaction                                                aa9 

                                                                                                  

                                                                                                  

Medication:                                                                                       

16:32 VIS not applicable for this client.                                                     db  

                                                                                                  

Intake:                                                                                           

19:08 IV: 500ml; Total: 500ml.                                                                db  

                                                                                                  

Outcome:                                                                                          

20:08 Discharge ordered by MD.                                                                kb  

21:20 Decision to Hospitalize by Provider.                                                    kb  

23:50 Patient left the ED.                                                                    hb  

23:50 Admitted to Med/surg accompanied by tech, via stretcher, with chart, Report called to   carmine Grove RN                                                                                  

23:50 Condition: stable                                                                           

23:50 Instructed on the need for admit, Demonstrated understanding of instructions.               

                                                                                                  

Signatures:                                                                                       

Dispatcher [employer]                           EDKathryn Arce, SHELLEY BRYANTP-Renetta Moore, RN                      RN                                                      

Lien Khan RN RN                                                      

Bethany Mauricio4                                                  

Luz Logan Lynsea, RN                      RN   ll3                                                  

Kayce Yung RN                       RN   Sally Morgan RN                   RN   kc6                                                  

Robina Valle RN RN   db                                                   

                                                                                                  

**************************************************************************************************

## 2022-12-17 NOTE — RAD REPORT
EXAM DESCRIPTION:  CT - Abdomen   Pelvis W Contrast - 12/17/2022 5:59 pm

 

CLINICAL HISTORY:  Abdominal pain

 

COMPARISON:  December 3, 2022

 

TECHNIQUE:  Computed axial tomography of the abdomen pelvis was obtained. 100 cc Isovue-300 was admin
istered intravenously. Oral contrast was not requested which limits evaluation of bowel and appendix

 

All CT scans are performed using dose optimization technique as appropriate and may include automated
 exposure control or mA/KV adjustment according to patient size.

 

FINDINGS:  Fatty liver

 

Spleen, pancreas, adrenals and right kidney unremarkable.

 

4.1 centimeter left renal cyst.

 

Cholelithiasis. No gallbladder wall thickening.

 

Small moderate hiatal hernia

 

Calcified uterine fibroids.

 

Diverticula stem from colon.

 

4 centimeter apparent soft tissue structure proximal sigmoid colon. Mild stranding within the adjacen
t fat.

 

IMPRESSION:  4 centimeter apparent soft tissue structure proximal sigmoid colon may represent neoplas
m. Another consideration is that this is secondary to mild diverticulitis.

 

Cholelithiasis

## 2022-12-17 NOTE — P.HP
Certification for Inpatient


Patient admitted to: Observation


With expected LOS: <2 Midnights


Patient will require the following post-hospital care: None


Practitioner: I am a practitioner with admitting privileges, knowledge of 

patient current condition, hospital course, and medical plan of care.


Services: Services provided to patient in accordance with Admission requirements

found in Title 42 Section 412.3 of the Code of Federal Regulations





<Lisa Stone - Last Filed: 12/17/22 23:03>





Patient History


Date of Service: 12/17/22


Reason for admission: Intractable Vomiting


History of Present Illness: 


Patient is a 67 year old female with past medical history of hypertension who 

presented to the ED with complaints of nausea, vomiting, and abdominal pain. 

Patient was seen here 1.5 weeks ago and found to have a possible colonic mass 

and instructed to follow up with GI. She has an appointment with Dr. Rodriguez on 

Tuesday. Labs are significant for 18.2, hgb 11.9, sodium 132. LFTs WNL. CT 

abdomen pelvis today showed  "4 centimeter apparent soft tissue structure 

proximal sigmoid colon may represent neoplasm. Another consideration is that 

this is secondary to mild diverticulitis. Cholelithiasis." She continued to 

vomit in the ED despite several rounds of antiemetics.  Patient is admitted for 

further management/observation of intractable vomiting. 





Home medications list reviewed: Yes





- Past Medical/Surgical History


Diabetic: No


-: Hypertension


-: Bilateral knee


Psychosocial/ Personal History: Patient lives at home with her family.





- Family History


  ** Mother


-: Cancer


Notes: Colon Cancer





- Social History


Smoking Status: Never smoker


Alcohol use: No


CD- Drugs: No


Caffeine use: Yes


Place of Residence: Home





<Lisa Stone - Last Filed: 12/17/22 23:03>


Date of Service: 12/25/22





<Catarino Broussard - Last Filed: 12/25/22 00:29>


Allergies





No Known Allergies Allergy (Verified 12/17/22 23:56)


   








Review of Systems


Gastrointestinal: Nausea, Vomiting, Abdominal Pain





<ChaseLisa - Last Filed: 12/17/22 23:03>





Physical Examination





- Vital Signs


Temperature: 98.2 F


Blood Pressure: 189/90


Pulse: 94


Respirations: 19


Pulse Ox (%): 99 (room air)





- Physical Exam


General: Alert, In no apparent distress


HEENT: Atraumatic, PERRLA, EOMI, Sclerae nonicteric


Neck: Supple, 2+ carotid pulse no bruit, No LAD, Without JVD or thyroid 

abnormality


Respiratory: Clear to auscultation bilaterally, Normal air movement


Cardiovascular: Regular rate/rhythm, Normal S1 S2


Gastrointestinal: Normal bowel sounds, No tenderness


Musculoskeletal: No tenderness


Integumentary: No rashes


Neurological: Normal speech, Normal strength at 5/5 x4 extr, Normal tone, Normal

affect





- Studies


Laboratory Data (last 24 hrs)





12/17/22 17:35: Sodium 132 L, Potassium 3.7, BUN 12, Creatinine 0.99, Glucose 

123 H, Total Bilirubin 0.3, AST 18, ALT 20, Alkaline Phosphatase 93, Lipase 60 L


12/17/22 17:20: WBC 18.20 H, Hgb 11.9 L, Hct 37.1, Plt Count 379








<Lisa Stone - Last Filed: 12/17/22 23:03>





Assessment and Plan





- Problems (Diagnosis)


(1) Intractable vomiting with nausea


Status: Acute   





(2) Hypertension


Status: Chronic   


Qualifiers: 


   Hypertension type: primary hypertension   Qualified Code(s): I10 - Essential 

(primary) hypertension   





(3) Diverticulitis


Status: Acute   





(4) Colonic mass


Status: Acute   





- Plan


Patient is admitted for observation/further management of intractable vomiting.


Continue IV cipro/flagyl for leukocytosis, diverticulitis vs gastroenteritis.


Will obtain abdominal ultrasound to further assess gallbladder. General surgery 

consult if needed. LFTs WNL.


CT showing 4 centimeter apparent soft tissue structure proximal sigmoid colon 

may represent neoplasm. Has GI follow up Tuesday. 


Clear liquid diet, advance as tolerated.


Supportive measures with antiemetics, pain medications, and IV fluids.


Monitor and replete electrolytes per protocol.


Reconcile and continue home medications.


Lovenox for VTE prophylaxis.


Full code








- Advance Directives


Does patient have a Living Will: No


Does patient have a Durable POA for Healthcare: No





<Lisa Stone - Last Filed: 12/17/22 23:03>

## 2022-12-17 NOTE — XMS REPORT
Continuity of Care Document

                          Created on:2022



Patient:ROLANDO FOSTER

Sex:Female

:1955

External Reference #:[id number]





Demographics







                          Address                   [address]



                                                    [place] [address]

 

                          Home Phone                [phone]

 

                          Work Phone                [phone]

 

                          Email Address             [email or web address]

 

                          Preferred Language        Unknown

 

                          Marital Status            Unknown

 

                          Buddhism Affiliation     Unknown

 

                          Race                      Unknown

 

                          Additional Race(s)        Unavailable



                                                    Unavailable

 

                          Ethnic Group              Unknown









Author







                          Organization              [facility]

t

 

                          Address                   [address]



                                                    [place] [address]

 

                          Phone                     [phone]









Support







                Name            Relationship    Address         Phone

 

                1               OLAMIDE          Unavailable     Unavailable

 

                2               Unavailable     Unavailable     Unavailable









Care Team Providers







                    Name                Role                Phone

 

                    ISABELLE CINTRON Attending Clinician Unavailable

 

                    TRACEE SOOD      Attending Clinician Unavailable

 

                    ROYA AMAYA    Attending Clinician Unavailable









Payers







           Payer Name Policy Type Policy Number Effective Date Expiration Date S

ourrosemarie

 

           [employer]            [id number]  2020            



           PPO                              00:00:00              

 

           [facility]            [id number]            



           [facility]                         00:00:00              







Problems

This patient has no known problems.



Allergies, Adverse Reactions, Alerts







       Allergy Allergy Status Severity Reaction(s) Onset  Inactive Treating Comm

ents 

Source



       Name   Type                        Date   Date   Clinician        

 

       [facility]   Active Med    N/V                          [facility]                      3-21                        ity of



                                          00:00:                      [address]







Medications

This patient has no known medications.



Procedures

This patient has no known procedures.



Encounters







        Start   End     Encounter Admission Attending Care    Care    Encounter 

Source



        Date/Time Date/Time Type    Type    Clinicians Facility Department ID   

   

 

        2021 Outpatient         NICHOLAS CINTRON  [id number]

[address]



        09:30:00 09:30:00                 ISABELLE mckeon

 

        2020 Outpatient R       BARRIE  [facility]    [id number]

[address]



        14:00:00 14:00:00                 TRACEE                         [facility]

 

        2020 Outpatient R       JOSE LUIS [facility]    [id number]

[address]



        10:20:00 10:20:00                 ROYA                          [facility]

 

        2020 Outpatient R               [facility]    [id number]

[address]



        17:00:00 17:00:00                                                 [facility]

 

        2020 2020 Outpatient R               [facility]    [id number]

[address]



        09:10:00 09:10:00                                                 [facility]







Results

This patient has no known results.

## 2022-12-17 NOTE — EDPHYS
Physician Documentation                                                                           

 [facility]                                                                 

Name: Shawnee Hall                                                                             

Age: 67 yrs                                                                                       

Sex: Female                                                                                       

: 1955                                                                                   

MRN: [id number]                                                                                   

Arrival Date: 2022                                                                          

Time: 15:47                                                                                       

Account#: [id number]                                                                            

Bed 7                                                                                             

Private MD:                                                                                       

ED Physician Manny Gutierrez                                                                         

HPI:                                                                                              

                                                                                             

20:06 This 67 yrs old Black Female presents to ER via Ambulatory with complaints of Vomiting. kb  

20:06 The patient presents to the emergency department with nausea, vomiting. Onset: The      kb  

      symptoms/episode began/occurred today. Possible causes: unknown. The symptoms are           

      aggravated by nothing. The symptoms are alleviated by nothing. Associated signs and         

      symptoms: Pertinent positives: abdominal pain, nausea, vomiting. Severity of symptoms:      

      At their worst the symptoms were moderate in the emergency department the symptoms are      

      unchanged. The patient has experienced a previous episode. The patient has been             

      recently seen at the [facility] Emergency Department, a couple       

      of weeks ago. Pt reports nausea and vomiting that started at noon today. States she has     

      associated abd and chest pain similar to when she was here on 12/3. .                       

                                                                                                  

Historical:                                                                                       

- Allergies:                                                                                      

16:05 Ibuprofen;                                                                              ph  

- Home Meds:                                                                                      

16:05 losartan-hydrochlorothiazide 100-25 mg Oral tab 1 tab once daily [Active];              ph  

- PMHx:                                                                                           

16:05 Hypertensive disorder;                                                                  ph  

                                                                                                  

- Immunization history:: Adult Immunizations unknown.                                             

- Social history:: Smoking status: Patient denies any tobacco usage or history of.                

                                                                                                  

                                                                                                  

ROS:                                                                                              

20:03 Constitutional: Negative for fever, chills, and weight loss.                            kb  

20:03 Abdomen/GI: Positive for abdominal pain, nausea and vomiting, Negative for diarrhea,        

      constipation.                                                                               

20:03 All other systems are negative.                                                             

                                                                                                  

Exam:                                                                                             

19:51 Constitutional:  This is a well developed, well nourished patient who is awake, alert,  kb  

      and in no acute distress. Head/Face:  Normocephalic, atraumatic. ENT:  Moist Mucous         

      membranes Cardiovascular:  Regular rate and rhythm with a normal S1 and S2.  No             

      gallops, murmurs, or rubs.  No pulse deficits. Respiratory:  Respirations even and          

      unlabored. No increased work of breathing. Talking in full sentences Skin:  Warm, dry       

      with normal turgor.  Normal color. MS/ Extremity:  Pulses equal, no cyanosis.               

      Neurovascular intact.  Full, normal range of motion. Neuro:  Awake and alert, GCS 15,       

      oriented to person, place, time, and situation. Moves all extremities. Normal gait.         

      Psych:  Awake, alert, with orientation to person, place and time.  Behavior, mood, and      

      affect are within normal limits.                                                            

19:51 ECG was reviewed by the Attending Physician.                                                

19:51 Abdomen/GI: Inspection: abdomen appears normal, Bowel sounds: normal, in all quadrants,     

      Palpation: soft, in all quadrants, mild abdominal tenderness, in the left lower             

      quadrant.                                                                                   

                                                                                                  

Vital Signs:                                                                                      

16:01 Pulse 89; Resp 18; Temp 98.2; Pulse Ox 100% on R/A; Weight 80.29 kg; Height 5 ft. 1 in. ph  

      (154.94 cm);                                                                                

16:04  / 74;                                                                            ph  

16:32  / 66; Pulse 90; Resp 16; Pulse Ox 99% on R/A;                                    db  

18:15  / 83; Pulse 95; Resp 16 S; Pulse Ox 100% ; Pain 9/10;                            kc6 

19:51  / 90; Pulse 94; Resp 19 S; Pulse Ox 99% ;                                        aa9 

23:17  / 78; Pulse 98; Resp 16; Pulse Ox 98% on R/A;                                    ll3 

16:01 Body Mass Index 33.44 (80.29 kg, 154.94 cm)                                             ph  

16:04 threw up BP meds this morning                                                           ph  

                                                                                                  

MDM:                                                                                              

15:49 Patient medically screened.                                                             kb  

20:03 Data reviewed: vital signs, nurses notes. Data interpreted: Pulse oximetry: on room air kb  

      is 99 %. Interpretation: normal. Counseling: I had a detailed discussion with the           

      patient and/or guardian regarding: the historical points, exam findings, and any            

      diagnostic results supporting the discharge/admit diagnosis, lab results, radiology         

      results, the need for outpatient follow up, a gastroenterologist, to return to the          

      emergency department if symptoms worsen or persist or if there are any questions or         

      concerns that arise at home. ED course: Discussed CT findings including possible colon      

      cancer. Pt states she was told about that last visit and has an appt with Dr Rodriguez on      

      Tuesday. .                                                                                  

21:18 ED course: Pt began vomiting again. Will admit for intractable vomiting. .              kb  

                                                                                                  

                                                                                             

15:56 Order name: CBC with Diff; Complete Time: 17:35                                         kb  

                                                                                             

15:56 Order name: CMP; Complete Time: 19:03                                                   kb  

                                                                                             

15:56 Order name: Lipase; Complete Time: 19:03                                                kb  

                                                                                             

19:13 Order name: Troponin High Sensitivity; Complete Time: 19:35                             kb  

                                                                                             

21:19 Order name: SARS RAPID; Complete Time: 22:11                                            kb  

                                                                                             

23:27 Order name: CREATININE WHOLE BLOOD; Complete Time: 23:35                                EDMS

                                                                                             

15:56 Order name: CT Abd/Pelvis - IV Contrast Only; Complete Time: 18:19                      kb  

                                                                                             

15:56 Order name: IV Saline Lock; Complete Time: 16:45                                        kb  

                                                                                             

15:56 Order name: Labs collected and sent; Complete Time: 16:45                               kb  

                                                                                             

16:58 Order name: Labs - recollect needed: hemolyzed; Complete Time: 18:36                    eb  

                                                                                             

17:45 Order name: Labs - recollect needed: recollect the recollect green top/ inside lab      eb  

      paged; Complete Time: 18:36                                                                 

                                                                                             

19:11 Order name: EKG; Complete Time: 19:12                                                   kb  

                                                                                             

19:11 Order name: EKG - Nurse/Tech; Complete Time: 19:45                                      kb  

                                                                                                  

EC:51 Rate is 90 beats/min. Rhythm is regular. QRS Axis is Normal. ID interval is normal at   kb  

      134 msec. QRS interval is normal at 82 msec. QT interval is normal at 447 msec.             

                                                                                                  

Administered Medications:                                                                         

16:30 Drug: NS 0.9% 1000 ml Route: IV; Rate: 1 bolus; Site: right antecubital;                db  

19:08 Follow up: Response: No adverse reaction; IV Status: Completed infusion; IV Intake:     db  

      500ml                                                                                       

16:30 Drug: Pepcid (famotidine) 20 mg Route: IVP; Site: right antecubital;                    db  

18:37 Follow up: Response: No adverse reaction                                                db  

16:30 Drug: Zofran (Ondansetron) 4 mg Route: IVP; Site: right antecubital;                    db  

18:37 Follow up: Response: No adverse reaction                                                db  

18:14 Drug: Phenergan (promethazine) 25 mg Route: IM; Site: right deltoid;                    kc6 

19:08 Follow up: Response: No adverse reaction                                                db  

19:08 Follow up: Response: Nausea is decreased                                                db  

18:14 Drug: morphine 2 mg Route: IVP; Infused Over: 4 mins; Site: right antecubital;          kc6 

19:08 Follow up: Response: No adverse reaction                                                db  

19:32 Drug: Losartan 100 mg Route: PO;                                                        aa9 

23:52 Follow up: Response: No adverse reaction                                                aa9 

19:38 Drug: Hydrochlorothiazide 25 mg Route: PO;                                              aa9 

23:52 Follow up: Response: No adverse reaction                                                aa9 

19:38 Drug: Flagyl (metroNIDAZOLE) 500 mg Route: PO;                                          aa9 

23:52 Follow up: Response: No adverse reaction                                                aa9 

19:38 Drug: Cipro (ciprofloxacin) 500 mg Route: PO;                                           aa9 

23:52 Follow up: Response: No adverse reaction                                                aa9 

19:38 Drug: Zofran (Ondansetron) 4 mg Route: IVP; Site: right antecubital;                    aa9 

23:52 Follow up: Response: No adverse reaction                                                aa9 

20:53 Not Given (Patient Refused): fentaNYL (PF) 25 mcg IVP once                              ll3 

21:30 Drug: Ambien (zolpidem) 10 mg Route: PO;                                                ll3 

23:51 Follow up: Response: No adverse reaction; Marked relief of symptoms                     aa9 

22:18 Drug: Reglan (metoCLOPramide) 10 mg Route: IVP; Site: right antecubital;                ll3 

23:51 Follow up: Response: No adverse reaction                                                aa9 

                                                                                                  

                                                                                                  

Disposition:                                                                                      

                                                                                             

19:41 Co-signature as Attending Physician, Manny Gutierrez MD.                                    rn  

                                                                                                  

Disposition Summary:                                                                              

22 21:20                                                                                    

Hospitalization Ordered                                                                           

      Hospitalization Status: Observation                                                     kb  

      Provider: Michael Godinez  

      Location: Telemetry/[facility] (observation)(22 21:20)                               kb  

      Condition: Stable(22 21:20)                                                       kb  

      Problem: new                                                                            kb  

      Symptoms: are unchanged                                                                 kb  

      Bed/Room Type: Standard                                                                   

      Room Assignment: 228(22 22:44)                                                      

      Diagnosis                                                                                   

        - Diverticulitis of intestine, part unspecified, without perforation or abscess       kb  

      without bleeding(22 21:20)                                                            

        - Nausea with vomiting, unspecified                                                   kb  

        - Elevated white blood cell count(22 21:20)                                     kb  

      Forms:                                                                                      

        - Medication Reconciliation Form                                                      kb  

        - SBAR form                                                                           kb  

Signatures:                                                                                       

Dispatcher [employer]                           Kathryn Zapien, FNP-C                 FNP-Teena Melo, Dede, RN                        RN   mw                                                   

Manny Gutierrez MD MD rn Hall, Patricia RN                      RN   Gail Dash Lynsea, RN                      RN   ll3                                                  

Kayce Yung, RN                       RN   aa9                                                  

Sally Elizondo RN                   RN   kc6                                                  

Robina Valle RN                    RN   Lisa Dennis, PA-C                     PA-C sb4                                                  

                                                                                                  

Corrections: (The following items were deleted from the chart)                                    

                                                                                             

21: 20:08 Home kb                                                                           kb  

: 20:08 Stable kb                                                                         kb  

: 20:08 Diverticulitis of intestine, part unspecified, without perforation or abscess     kb  

      without bleeding kb                                                                         

: 20:08 Elevated white blood cell count kb                                                kb  

22:44 21:20 kb                                                                                mw  

                                                                                                  

**************************************************************************************************

## 2022-12-18 LAB
BUN BLD-MCNC: 11 MG/DL (ref 7–18)
GLUCOSE SERPLBLD-MCNC: 160 MG/DL (ref 74–106)
HCT VFR BLD CALC: 34.2 % (ref 36–45)
LYMPHOCYTES # SPEC AUTO: 0.6 K/UL (ref 0.7–4.9)
MAGNESIUM SERPL-MCNC: 1.6 MG/DL (ref 1.6–2.4)
MCV RBC: 80.1 FL (ref 80–100)
PMV BLD: 9.1 FL (ref 7.6–11.3)
POTASSIUM SERPL-SCNC: 3.2 MMOL/L (ref 3.5–5.1)
RBC # BLD: 4.28 M/UL (ref 3.86–4.86)

## 2022-12-18 RX ADMIN — METOCLOPRAMIDE PRN MG: 5 INJECTION, SOLUTION INTRAMUSCULAR; INTRAVENOUS at 08:01

## 2022-12-18 RX ADMIN — POTASSIUM CHLORIDE SCH MLS: 200 INJECTION, SOLUTION INTRAVENOUS at 09:53

## 2022-12-18 RX ADMIN — METRONIDAZOLE SCH MLS: 500 INJECTION, SOLUTION INTRAVENOUS at 08:24

## 2022-12-18 RX ADMIN — SODIUM CHLORIDE SCH MG: 0.9 INJECTION, SOLUTION INTRAVENOUS at 20:22

## 2022-12-18 RX ADMIN — Medication SCH ML: at 08:25

## 2022-12-18 RX ADMIN — Medication SCH ML: at 20:21

## 2022-12-18 RX ADMIN — ZOLPIDEM TARTRATE PRN MG: 10 TABLET, FILM COATED ORAL at 21:18

## 2022-12-18 RX ADMIN — METRONIDAZOLE SCH MLS: 500 INJECTION, SOLUTION INTRAVENOUS at 00:24

## 2022-12-18 RX ADMIN — Medication SCH ML: at 00:34

## 2022-12-18 RX ADMIN — POTASSIUM & SODIUM PHOSPHATES POWDER PACK 280-160-250 MG SCH PKT: 280-160-250 PACK at 09:51

## 2022-12-18 RX ADMIN — SODIUM CHLORIDE SCH MLS: 0.9 INJECTION, SOLUTION INTRAVENOUS at 00:23

## 2022-12-18 RX ADMIN — ACETAMINOPHEN PRN MG: 500 TABLET, FILM COATED ORAL at 08:23

## 2022-12-18 RX ADMIN — METRONIDAZOLE SCH MLS: 500 INJECTION, SOLUTION INTRAVENOUS at 17:55

## 2022-12-18 RX ADMIN — SODIUM CHLORIDE SCH MG: 0.9 INJECTION, SOLUTION INTRAVENOUS at 00:23

## 2022-12-18 RX ADMIN — CIPROFLOXACIN SCH MLS: 2 INJECTION, SOLUTION INTRAVENOUS at 08:25

## 2022-12-18 RX ADMIN — SODIUM CHLORIDE SCH MLS: 0.9 INJECTION, SOLUTION INTRAVENOUS at 13:02

## 2022-12-18 RX ADMIN — LOSARTAN POTASSIUM AND HYDROCHLOROTHIAZIDE SCH TAB: 50; 12.5 TABLET, FILM COATED ORAL at 08:23

## 2022-12-18 RX ADMIN — ENOXAPARIN SODIUM SCH MG: 40 INJECTION SUBCUTANEOUS at 08:24

## 2022-12-18 RX ADMIN — METOPROLOL TARTRATE SCH: 50 TABLET, FILM COATED ORAL at 17:54

## 2022-12-18 RX ADMIN — POTASSIUM CHLORIDE SCH MLS: 200 INJECTION, SOLUTION INTRAVENOUS at 08:25

## 2022-12-18 RX ADMIN — HYDRALAZINE HYDROCHLORIDE PRN MG: 20 INJECTION INTRAMUSCULAR; INTRAVENOUS at 00:23

## 2022-12-18 RX ADMIN — POTASSIUM & SODIUM PHOSPHATES POWDER PACK 280-160-250 MG SCH PKT: 280-160-250 PACK at 08:24

## 2022-12-18 RX ADMIN — SODIUM CHLORIDE SCH MG: 0.9 INJECTION, SOLUTION INTRAVENOUS at 08:24

## 2022-12-18 RX ADMIN — CIPROFLOXACIN SCH MLS: 2 INJECTION, SOLUTION INTRAVENOUS at 20:21

## 2022-12-18 NOTE — RAD REPORT
EXAM DESCRIPTION:  US - Abdomen Exam Limited - 12/18/2022 7:02 am

 

CLINICAL HISTORY:  cholelithiasis, intractable vomiting

 

COMPARISON:  Abdomen   Pelvis W Contrast dated 12/17/2022

 

FINDINGS:  Gallbladder size is normal. A 12 millimeter echogenic focus is present in the gallbladder 
fundus. There is limited or absent posterior acoustic shadowing. This is most likely a gallstone. Gal
lbladder wall mass is not suspected. On the CT study the gallstone is visible in the adjacent wall is
 not thickened or edematous. No other stones or sludge identifiable.

 

 Common bile duct is 6 mm, normal range, with no common duct stone identified.

 

There was limited assessment performed of the left kidney which shows normal size. A 4.4 centimeter t
hin-walled simple cyst is identified. There is some heterogeneity of renal cortical echogenicity. Elieser
lonephritis is possible though sonography is limited in this evaluation. Correlation is needed with a
ny UA abnormalities or supporting clinical/ laboratory findings.

 

IMPRESSION:  Single 12 mm gallstone without other gallbladder or biliary tree findings.

 

Partially imaged left kidney show some heterogeneity to the renal parenchymal echogenicity. A 4.4 dariel
timeter thin-walled simple cyst is present in the central kidney.

 

Pyelonephritis cannot be excluded. Sonography is limited in this evaluation. Correlation is needed wi
th any clinical symptoms, UA abnormalities or other supporting laboratory findings.

## 2022-12-18 NOTE — EKG
Test Date:    2022-12-17               Test Time:    19:43:57

Technician:   LINDA                                     

                                                     

MEASUREMENT RESULTS:                                       

Intervals:                                           

Rate:         90                                     

MS:           134                                    

QRSD:         82                                     

QT:           366                                    

QTc:          447                                    

Axis:                                                

P:            54                                     

MS:           134                                    

QRS:          24                                     

T:            4                                      

                                                     

INTERPRETIVE STATEMENTS:                                       

                                                     

Normal sinus rhythm

Biatrial enlargement

Possible Inferior infarct, age undetermined

Cannot rule out Anterior infarct, age undetermined

Abnormal ECG

Compared to ECG 12/03/2022 21:47:32

Myocardial infarct finding now present

Atrial premature complex(es) no longer present



Electronically Signed On 12-18-22 15:49:11 CST by Mick Travis

## 2022-12-19 LAB
BUN BLD-MCNC: 10 MG/DL (ref 7–18)
GLUCOSE SERPLBLD-MCNC: 101 MG/DL (ref 74–106)
HCT VFR BLD CALC: 30 % (ref 36–45)
LYMPHOCYTES # SPEC AUTO: 2.1 K/UL (ref 0.7–4.9)
MAGNESIUM SERPL-MCNC: 1.9 MG/DL (ref 1.6–2.4)
MCV RBC: 79.8 FL (ref 80–100)
PMV BLD: 9.1 FL (ref 7.6–11.3)
POTASSIUM SERPL-SCNC: 3.3 MMOL/L (ref 3.5–5.1)
RBC # BLD: 3.76 M/UL (ref 3.86–4.86)

## 2022-12-19 RX ADMIN — SODIUM CHLORIDE SCH: 0.9 INJECTION, SOLUTION INTRAVENOUS at 05:25

## 2022-12-19 RX ADMIN — SODIUM CHLORIDE SCH: 0.9 INJECTION, SOLUTION INTRAVENOUS at 15:25

## 2022-12-19 RX ADMIN — METOPROLOL TARTRATE SCH: 50 TABLET, FILM COATED ORAL at 05:52

## 2022-12-19 RX ADMIN — LOSARTAN POTASSIUM AND HYDROCHLOROTHIAZIDE SCH TAB: 50; 12.5 TABLET, FILM COATED ORAL at 08:29

## 2022-12-19 RX ADMIN — Medication SCH ML: at 08:31

## 2022-12-19 RX ADMIN — CIPROFLOXACIN SCH MG: 500 TABLET, FILM COATED ORAL at 19:54

## 2022-12-19 RX ADMIN — SODIUM CHLORIDE SCH: 0.9 INJECTION, SOLUTION INTRAVENOUS at 09:00

## 2022-12-19 RX ADMIN — POTASSIUM & SODIUM PHOSPHATES POWDER PACK 280-160-250 MG SCH PKT: 280-160-250 PACK at 10:39

## 2022-12-19 RX ADMIN — SODIUM CHLORIDE SCH MLS: 0.9 INJECTION, SOLUTION INTRAVENOUS at 04:57

## 2022-12-19 RX ADMIN — METRONIDAZOLE SCH: 500 INJECTION, SOLUTION INTRAVENOUS at 09:00

## 2022-12-19 RX ADMIN — METOCLOPRAMIDE PRN MG: 5 INJECTION, SOLUTION INTRAMUSCULAR; INTRAVENOUS at 14:03

## 2022-12-19 RX ADMIN — SODIUM CHLORIDE SCH MLS: 0.9 INJECTION, SOLUTION INTRAVENOUS at 19:54

## 2022-12-19 RX ADMIN — Medication SCH: at 19:55

## 2022-12-19 RX ADMIN — PANTOPRAZOLE SODIUM SCH MG: 40 TABLET, DELAYED RELEASE ORAL at 17:04

## 2022-12-19 RX ADMIN — ENOXAPARIN SODIUM SCH MG: 40 INJECTION SUBCUTANEOUS at 08:27

## 2022-12-19 RX ADMIN — POTASSIUM & SODIUM PHOSPHATES POWDER PACK 280-160-250 MG SCH PKT: 280-160-250 PACK at 09:37

## 2022-12-19 RX ADMIN — CIPROFLOXACIN SCH MG: 500 TABLET, FILM COATED ORAL at 11:34

## 2022-12-19 RX ADMIN — PANTOPRAZOLE SODIUM SCH MG: 40 TABLET, DELAYED RELEASE ORAL at 09:36

## 2022-12-19 RX ADMIN — ACETAMINOPHEN PRN MG: 500 TABLET, FILM COATED ORAL at 06:49

## 2022-12-19 RX ADMIN — ONDANSETRON PRN MG: 4 TABLET, ORALLY DISINTEGRATING ORAL at 10:34

## 2022-12-19 RX ADMIN — POTASSIUM & SODIUM PHOSPHATES POWDER PACK 280-160-250 MG SCH PKT: 280-160-250 PACK at 08:27

## 2022-12-19 RX ADMIN — METOPROLOL TARTRATE SCH MG: 50 TABLET, FILM COATED ORAL at 17:04

## 2022-12-19 RX ADMIN — METRONIDAZOLE SCH MLS: 500 INJECTION, SOLUTION INTRAVENOUS at 00:10

## 2022-12-19 RX ADMIN — CIPROFLOXACIN SCH: 2 INJECTION, SOLUTION INTRAVENOUS at 09:00

## 2022-12-19 NOTE — P.PN
Subjective


Date of Service: 12/19/22


Chief Complaint: Intractable Vomiting


No acute events overnight. She reports that her nausea and vomiting is worsening

compared to yesterday. She has been passing flatus and has had a bowel movement.





Review of Systems


10-point ROS is otherwise unremarkable


Gastrointestinal: Nausea, Vomiting, Abdominal Pain





Physical Examination





- Vital Signs


Temperature: 98.3 F


Blood Pressure: 169/74


Pulse: 65


Respirations: 18


Pulse Ox (%): 97





- Physical Exam


General: Alert, In no apparent distress, Oriented x3


HEENT: Atraumatic, Mucous membr. moist/pink, EOMI, Sclerae nonicteric


Neck: JVD not distended


Respiratory: Clear to auscultation bilaterally, Normal air movement


Cardiovascular: No edema, Regular rate/rhythm, Normal S1 S2, No gallops, No 

rubs, No murmurs


Gastrointestinal: Normal bowel sounds, Soft and benign, Non-distended, No 

tenderness, No rebound, No guarding


Musculoskeletal: No clubbing


Integumentary: No rashes


Neurological: Normal speech, Normal affect





Assessment And Plan





- Plan


# Intractable Nausea/Vomiting


# History of Peptic Ulcer Disease


# Cholelithiasis


# Small-Moderate Hiatal Hernia


- No evidence of bowel obstruction clinically


- Consulted Gastroenterology and spoke with Dr. Krishna - recommendations 

appreciated


   - Plans for EGD in AM


- Continue empiric ciprofloxacin + metronidazole for now


- Continue pantoprazole


- PRN ondansetron, metoclopromide, dicyclomine


- Clear Liquid Diet - advance as tolerated


- NPO after midnight





# Hypertensive Urgency


- Continue home losartan-hydrochlorothiazide


- PRN hydralazine





# Proximal Sigmoid Colonic Mass (4 cm)


- Consulted Gastroenterology and spoke with Dr. Krishna - recommendations 

appreciated


   - Recommended continued outpatient follow-up with Dr. Rodriguez as previously 

scheduled





# Left Renal Cyst (4.1 cm)


- Follow-up with PCP








Dani Thompson M.D.

## 2022-12-19 NOTE — P.PN
Date of Service: 12/18/22





Subjective


Patient is still nauseated.  Overall feeling better.  She has an appointment to 

see gastroenterology for evaluation for colonoscopy.  Patient has been seen in 

the emergency room on 2 different occasions and has been found to have a 

possible sigmoid lesion.  Continue with antibiotic therapy at this time.  As 

long as patient is tolerating her diet she should be able to go home for GI 

follow-up in a.m..





Physical Examination





- Vital Signs


Reviewed





- Physical Exam


General: Alert, In no apparent distress


Respiratory: Clear to auscultation bilaterally


Cardiovascular: Regular rate/rhythm, Normal S1 S2


Gastrointestinal: Normal bowel sounds, No tenderness


Neurological: No focal deficits








Assessment and Plan





- Problems (Diagnosis)


(1) Intractable vomiting with nausea


Current Visit: Yes   Status: Acute   





(2) Hypertension


Current Visit: Yes   Status: Chronic   


Qualifiers: 


   Hypertension type: primary hypertension   Qualified Code(s): I10 - Essential 

(primary) hypertension   





(3) Diverticulitis


Current Visit: Yes   Status: Acute   





(4) Colonic mass


Current Visit: Yes   Status: Acute   





- Plan


-Continue IV cipro/flagyl for leukocytosis, diverticulitis vs gastroenteritis.


-CT showing 4 centimeter apparent soft tissue structure proximal sigmoid colon 

may represent neoplasm. Has GI follow up Tuesday. 


-Clear liquid diet, advance as tolerated.


-Supportive measures with antiemetics, pain medications, and IV fluids.


-Monitor and replete electrolytes per protocol.


-Lovenox for VTE prophylaxis.


-Full code

## 2022-12-20 LAB
BUN BLD-MCNC: 9 MG/DL (ref 7–18)
GLUCOSE SERPLBLD-MCNC: 90 MG/DL (ref 74–106)
HCT VFR BLD CALC: 28.8 % (ref 36–45)
LYMPHOCYTES # SPEC AUTO: 2.3 K/UL (ref 0.7–4.9)
MAGNESIUM SERPL-MCNC: 1.7 MG/DL (ref 1.6–2.4)
MCV RBC: 79.4 FL (ref 80–100)
PMV BLD: 9 FL (ref 7.6–11.3)
POTASSIUM SERPL-SCNC: 3.7 MMOL/L (ref 3.5–5.1)
RBC # BLD: 3.63 M/UL (ref 3.86–4.86)

## 2022-12-20 PROCEDURE — 0DB38ZX EXCISION OF LOWER ESOPHAGUS, VIA NATURAL OR ARTIFICIAL OPENING ENDOSCOPIC, DIAGNOSTIC: ICD-10-PCS

## 2022-12-20 RX ADMIN — PANTOPRAZOLE SODIUM SCH MG: 40 TABLET, DELAYED RELEASE ORAL at 17:37

## 2022-12-20 RX ADMIN — CIPROFLOXACIN SCH MG: 500 TABLET, FILM COATED ORAL at 21:45

## 2022-12-20 RX ADMIN — ENOXAPARIN SODIUM SCH: 40 INJECTION SUBCUTANEOUS at 09:00

## 2022-12-20 RX ADMIN — CIPROFLOXACIN SCH MG: 500 TABLET, FILM COATED ORAL at 12:15

## 2022-12-20 RX ADMIN — CIPROFLOXACIN SCH: 500 TABLET, FILM COATED ORAL at 09:00

## 2022-12-20 RX ADMIN — LOSARTAN POTASSIUM AND HYDROCHLOROTHIAZIDE SCH TAB: 50; 12.5 TABLET, FILM COATED ORAL at 12:14

## 2022-12-20 RX ADMIN — PANTOPRAZOLE SODIUM SCH: 40 TABLET, DELAYED RELEASE ORAL at 07:30

## 2022-12-20 RX ADMIN — Medication SCH: at 07:56

## 2022-12-20 RX ADMIN — Medication SCH ML: at 21:46

## 2022-12-20 RX ADMIN — ZOLPIDEM TARTRATE PRN MG: 10 TABLET, FILM COATED ORAL at 21:45

## 2022-12-20 RX ADMIN — SODIUM CHLORIDE SCH: 0.9 INJECTION, SOLUTION INTRAVENOUS at 21:25

## 2022-12-20 RX ADMIN — METOPROLOL TARTRATE SCH: 50 TABLET, FILM COATED ORAL at 05:16

## 2022-12-20 RX ADMIN — METOPROLOL TARTRATE SCH MG: 50 TABLET, FILM COATED ORAL at 17:38

## 2022-12-20 NOTE — CON
Date of Consultation:  12/20/2022



Reason For Service:  Cholelithiasis.



History Of Present Illness:  This is the case of a 67-year-old patient admitted to the hospital for r
ecent nausea and bloating.  She said every time she eats she starts to have abdominal pain.  Seen by 
the gastroenterologist.  Imaging shows possible colonic mass and also found cholelithiasis.  She has 
been seen by Dr. Rodriguez properly and seen right now by Dr. Krishna while she is admitted to the [facility] and the reason I was called is just for cholelithiasis.  I discussed the case with the person.  
The patient says that she does not think it is the gallbladder because the pain happened immediately 
after she eats.  It was explained to her differential diagnosis of abdominal pain in that area that m
ay include cholelithiasis.  She stated after she eat even Jell-O, she had this bloating and had nause
a and vomiting and she stated that for the last year she has been experiencing some dark blood in the
 stools.  The patient has had an EGD with some findings, but not no evidence of peptic ulcer disease.
  The patient says in the past she has history of ulcers before 2018.  At that moment in 2018, her ga
stroenterologist was Dr. Rodriguez and patient claimed that she had a colonoscopy that was completely ne
gative for any malignancy.



Allergies:  IBUPROFEN.



Past Medical History:  Hypertension.



Past Surgical History:  Bilateral knee surgeries.



Family History:  Mother with colon cancer.



Social History:  She does not smoke.  She does not drink alcohol.



Review of Systems:

Stated nausea and vomiting, after eating, but then also she has states evidence of dark stools almost
 every week.  She denies any weight loss.



Physical Examination:

General:  The patient is awake and alert. 

Eyes:  Pupils are equal and reactive.  Anicteric. 

Neck:  Supple. 

Chest:  Clear. 

Abdomen:  Soft and depressible.  No Kulkarni sign.  No guarding or rebound. 

Rectal:  Deferred. 

Extremities:  Good capillary refill.



Laboratory Data:  Blood work shows WBC count of 11.5, coming down from 18 and platelets of 317.  Tota
l bilirubin of 0.3, alkaline phosphate 93.  Abdominal ultrasound shows cholelithiasis and that was in
 12/2017.  The patient also had a CAT scan read by Dr. Jacobson showing a 4 cm soft tissue structure i
n proximal sigmoid colon, mild stranding adjacent to that.



Assessment:  This is a 67-year-old patient with some issues.  She claimed she has melena and has it a
lmost every week.  She also has a 4 cm structure in the sigmoid colon, cannot rule out diverticulitis
 versus neoplasm.  She also has mild stranding around the area of that sigmoid area.  She had diverti
culum of the colon.  She has uterine fibroid, small hiatal hernia, and cholelithiasis with no gallbla
dder wall thickening.  She also has a 4 cm renal cyst and family history of colon cancer in the mothe
r.  Last colonoscopy was in 2018.  She is not convinced at this moment of going for a gallbladder vivienne
ozzie.  She understands it maybe causing some of the symptoms, she is more interested in bleeding, whi
ch I fully understand with a history of colon cancer.  Dr. Krishna is working on that and he is very
 capable of doing this workup for this lady.  For the gallbladder, we are interested to do a HIDA sca
n in the meantime.





MANDEEP/REKHA

DD:  12/20/2022 21:46:33Voice ID:  [id number]

DT:  12/20/2022 22:31:46Report ID:  [id number]

## 2022-12-20 NOTE — OP
Surgeon:  James Krishna MD



Procedure Performed:  Esophagogastroduodenoscopy.



Indication For Procedure:  Intractable nausea and vomiting.



Anesthesia:  Monitored anesthesia care.



Complexity:  Average.



Technique:  After obtaining informed consent from the patient and explaining risks and complications 
, which include, but are not limited to bleeding, infection, perforation, and anesthesia complication
, patient was placed in a left lateral position and sedation was given.  Then the scope was advanced 
through the mouth and carefully guided up till the second portion of the duodenum.  After the complet
ion of procedure, scope and equipment were withdrawn and procedure terminated in a safe manner.



Findings:  Esophagus:  No gross lesion seen in the upper and mid esophagus; however, in the distal es
ophagus, there was evidence of retained food or pills.  This was pushed down into the stomach.  After
wards on re-examining the distal esophagus, there was no mass or lesion seen, but there appeared to b
e a mild stenosis.  Initially, it was unclear if this was causing patient any symptoms.  It likely is
 not the cause of the patient's nausea and vomiting.  But given this finding, we decided to go ahead 
and do a balloon dilation.  So through the scope balloon dilator, I dilated the distal esophagus from
 12 to 13.5 and eventually 15 mm and held the pressure for 30 seconds.  Post dilation did not reveal 
any significant improvement though, which may indicate less of a stricture problem and possibly we wo
uld want to rule out achalasia.  Biopsies were taken from the distal esophagus. 



Stomach:  Mild patchy areas of erythema seen in the body and antrum with areas of moderate erythema, 
specifically in the midbody.  Biopsies taken from multiple locations. 



Duodenum:  The bulb and second portion appeared normal.



Complications:  None.  Tolerance to anesthesia excellent.



Estimated Blood Loss:  Minimal.



Postoperative Diagnoses:  Questionable esophageal stenosis status post dilation versus suspicion of c
ardiac achalasia and gastritis status post biopsies.



Plan:  Continue current management.  Oral PPI.  We will do an upper GI series with small bowel follow
through.  Given her symptoms, I did a detailed review of the patient's history and imaging.  There is
 a suspicion of sigmoid lesion, which is around 4 cm versus diverticulitis as per Radiology, which is
 evidenced on 2 CAT scans.  However, she does state she had a colonoscopy done by Dr. Rodriguez around 2
 years ago and that was normal.  So it is unlikely that neoplasm would have developed that fast.  Sti
ll I would not assume the finding in the sigmoid would be causing her persistent nausea and vomiting.
  I would more be concerned about a gallbladder issue.  Therefore we have requested surgical evaluati
on.  There is no doubt patient will need a colonoscopy, but I would like to wait a couple of weeks be
fore doing it unless absolutely necessary, given the suspicion of diverticulitis.  This was discussed
 with the patient and  as well as the Primary Care Team.





US/REKHA

DD:  12/20/2022 16:21:00Voice ID:  [id number]

DT:  12/20/2022 18:18:46Report ID:  [id number]

## 2022-12-20 NOTE — P.PN
Subjective


Date of Service: 12/20/22


Chief Complaint: Intractable Vomiting


No acute events overnight. She reports that her nausea and vomiting is not 

improving, but seems to be post-prandial. Additional consideration is 

symptomatic cholelithiasis.





Review of Systems


10-point ROS is otherwise unremarkable


Gastrointestinal: Nausea, Vomiting, Abdominal Pain





Physical Examination





- Vital Signs


Temperature: 97.7 F


Blood Pressure: 175/79


Pulse: 72


Respirations: 16


Pulse Ox (%): 100





Assessment And Plan





- Plan


- Physical Exam


General: Alert, In no apparent distress, Oriented x3


HEENT: Atraumatic, Mucous membr. moist/pink, EOMI, Sclerae nonicteric


Neck: JVD not distended


Respiratory: Clear to auscultation bilaterally, Normal air movement


Cardiovascular: No edema, Regular rate/rhythm, Normal S1 S2, No gallops, No 

rubs, No murmurs


Gastrointestinal: Normal bowel sounds, Soft and benign, Non-distended, No 

tenderness, No rebound, No guarding


Musculoskeletal: No clubbing


Integumentary: No rashes


Neurological: Normal speech, Normal affect





# Intractable Nausea/Vomiting


# History of Peptic Ulcer Disease


# Cholelithiasis


# Small-Moderate Hiatal Hernia


- No evidence of bowel obstruction clinically


- Consulted Gastroenterology and spoke with Dr. Krishna - recommendations 

appreciated


   - Spoke with Dr. Krishna post-EGD, who believes that her symptoms may be 

secondary to cholelithiasis


   - He has consulted Dr. Damico - recommendations appreciated


- Continue empiric ciprofloxacin + metronidazole for now


- Continue pantoprazole


- PRN ondansetron, metoclopromide, dicyclomine


- Clear Liquid Diet - advance as tolerated


- NPO after midnight





# Hypertensive Urgency


- Continue home losartan-hydrochlorothiazide


- PRN hydralazine





# Proximal Sigmoid Colonic Mass (4 cm)


- Consulted Gastroenterology and spoke with Dr. Krishna - recommendations 

appreciated


   - Recommended continued outpatient follow-up with Dr. Rodriguez as previously 

scheduled





# Left Renal Cyst (4.1 cm)


- Follow-up with PCP








Dani Thompson M.D.

## 2022-12-21 VITALS — OXYGEN SATURATION: 98 %

## 2022-12-21 LAB
HCT VFR BLD CALC: 31.2 % (ref 36–45)
LYMPHOCYTES # SPEC AUTO: 2 K/UL (ref 0.7–4.9)
MCV RBC: 79.9 FL (ref 80–100)
PMV BLD: 8.7 FL (ref 7.6–11.3)
RBC # BLD: 3.91 M/UL (ref 3.86–4.86)

## 2022-12-21 PROCEDURE — 0DB68ZX EXCISION OF STOMACH, VIA NATURAL OR ARTIFICIAL OPENING ENDOSCOPIC, DIAGNOSTIC: ICD-10-PCS

## 2022-12-21 RX ADMIN — SODIUM CHLORIDE SCH MLS: 0.9 INJECTION, SOLUTION INTRAVENOUS at 00:40

## 2022-12-21 RX ADMIN — METOPROLOL TARTRATE SCH: 50 TABLET, FILM COATED ORAL at 05:09

## 2022-12-21 RX ADMIN — Medication SCH: at 09:00

## 2022-12-21 RX ADMIN — SODIUM CHLORIDE SCH MLS: 0.9 INJECTION, SOLUTION INTRAVENOUS at 07:25

## 2022-12-21 RX ADMIN — Medication SCH: at 20:33

## 2022-12-21 RX ADMIN — PANTOPRAZOLE SODIUM SCH MG: 40 TABLET, DELAYED RELEASE ORAL at 08:15

## 2022-12-21 RX ADMIN — HYDRALAZINE HYDROCHLORIDE PRN MG: 20 INJECTION INTRAMUSCULAR; INTRAVENOUS at 17:19

## 2022-12-21 RX ADMIN — PANTOPRAZOLE SODIUM SCH MG: 40 TABLET, DELAYED RELEASE ORAL at 17:15

## 2022-12-21 RX ADMIN — METOPROLOL TARTRATE SCH MG: 50 TABLET, FILM COATED ORAL at 17:16

## 2022-12-21 RX ADMIN — SODIUM CHLORIDE SCH: 0.9 INJECTION, SOLUTION INTRAVENOUS at 17:25

## 2022-12-21 RX ADMIN — ZOLPIDEM TARTRATE PRN MG: 10 TABLET, FILM COATED ORAL at 20:33

## 2022-12-21 RX ADMIN — CIPROFLOXACIN SCH MG: 500 TABLET, FILM COATED ORAL at 20:33

## 2022-12-21 RX ADMIN — CIPROFLOXACIN SCH MG: 500 TABLET, FILM COATED ORAL at 08:15

## 2022-12-21 RX ADMIN — LOSARTAN POTASSIUM AND HYDROCHLOROTHIAZIDE SCH TAB: 50; 12.5 TABLET, FILM COATED ORAL at 08:14

## 2022-12-21 RX ADMIN — ONDANSETRON PRN MG: 4 TABLET, ORALLY DISINTEGRATING ORAL at 17:15

## 2022-12-21 NOTE — P.PN
Subjective


Date of Service: 12/21/22


Chief Complaint: Intractable Vomiting


No acute events overnight. She reports that her symptoms are unchanged. She 

states that she has been unable to eat due to the significant nausea/vomiting. 

She has had no episodes of vomiting overnight. She was seen by Dr. Damico - 

plan is for HIDA scan this afternoon.





Review of Systems


10-point ROS is otherwise unremarkable


Gastrointestinal: Nausea, Vomiting, Abdominal Pain





Physical Examination





- Vital Signs


Temperature: 98.3 F


Blood Pressure: 159/74


Pulse: 76


Respirations: 16


Pulse Ox (%): 97





Assessment And Plan





- Plan


- Physical Exam


General: Alert, In no apparent distress, Oriented x3


HEENT: Atraumatic, Mucous membr. moist/pink, EOMI, Sclerae nonicteric


Neck: JVD not distended


Respiratory: Clear to auscultation bilaterally, Normal air movement


Cardiovascular: No edema, Regular rate/rhythm, Normal S1 S2, No gallops, No 

rubs, No murmurs


Gastrointestinal: Normal bowel sounds, Soft and benign, Non-distended, Mild 

generalized tenderness, No rebound, No guarding


Musculoskeletal: No clubbing


Integumentary: No rashes


Neurological: Normal speech, Normal affect








# Intractable Nausea/Vomiting


# History of Peptic Ulcer Disease


# Cholelithiasis


# Small-Moderate Hiatal Hernia


- No evidence of bowel obstruction clinically


- Consulted Gastroenterology and spoke with Dr. Krishna - recommendations 

appreciated


   - Spoke with Dr. Krishna post-EGD, who believes that her symptoms may be 

secondary to cholelithiasis


      - Initial concern for possible achalasia


      - Upper GI/small bowel series = "unremarkable upper GI. Unremarkable small

bowel series."


   - He has consulted Dr. Damico - recommendations appreciated


      - Plan for HIDA scan this afternoon ~14:00


         - Decision regarding laparoscopic cholecystectomy is pending this exam


- Continue empiric ciprofloxacin + metronidazole for now


- Continue pantoprazole


- PRN ondansetron, metoclopromide, dicyclomine





# Hypertensive Urgency


- Continue home losartan-hydrochlorothiazide


- PRN hydralazine





# Proximal Sigmoid Colonic Mass (4 cm)


- Consulted Gastroenterology and spoke with Dr. Krishna - recommendations 

appreciated


   - Recommended continued outpatient follow-up with Dr. Rodriguez as previously 

scheduled





# Left Renal Cyst (4.1 cm)


- Follow-up with PCP








Dani Thompson M.D.

## 2022-12-21 NOTE — RAD REPORT
EXAM DESCRIPTION:  NM - Hepatobiliary System W/ Ph - 12/21/2022 3:28 pm

 

CLINICAL HISTORY:  Abdominal pain

 

TECHNIQUE:  The patient was administered 6.1 millicuries technetium Choletec intravenous and images o
f the abdomen obtained for 19 minutes. Patient was given 1.5 micrograms Kinevac intravenously and hany
ges of the gallbladder obtained for 30 minutes

 

FINDINGS:  Liver demonstrates prompt radiotracer uptake.

 

Activity is seen within the gallbladder by 10 minutes.

 

After the administration of cck gallbladder ejection fraction equals 75% (normal values greater than 
35%

 

 Uptake is seen within small bowel.

 

Patient was asymptomatic prior to Kinevac appeared

 

Patient complained of pain and nausea 1/10 during the administration of CCK.

 

IMPRESSION:  No evidence of acute cholecystitis

 

Gallbladder ejection fraction equals 36% which is lower limits normal

## 2022-12-21 NOTE — PN
Date of Progress Note:  12/21/2022



Diagnoses:  Abdominal pain, nausea, vomiting, bloating, melena.



Subjective:  This is the case of a 67-year-old patient with multiple medical issues.  Dr. Erendira peters
s been working on her, trying to find etiology of her disease, although we suspect may be multifactor
ial and one of those factors may be the gallbladder, although cannot explain the inflammation that mayi puentes has in the sigmoid.  She has family history of colon cancer, so it is very important for her to hav
e this checkup again, since the last colonoscopy was done in 2018.  She feels better, abdomen is stella
gn.



Laboratory Data:  Blood work is; WBC count of 9.9 with hemoglobin of 9.9.



Plan:  We are waiting for the HIDA scan to be done, although I explained to her the fact that the HID
A scan may be abnormal, does not explain all her conditions, and she fully understands that.  That is
 why she does not want to consent for gallbladder removal, until she has, once again, all the aspect 
of this somehow clarified.  I agree.





MANDEEP/REKHA

DD:  12/21/2022 14:00:40Voice ID:  [id number]

DT:  12/21/2022 18:39:28Report ID:  [id number]

## 2022-12-21 NOTE — EKG
Test Date:    2022-12-18               Test Time:    14:15:11

Technician:   KRYSTEN FITZGERALD                                   

                                                     

MEASUREMENT RESULTS:                                       

Intervals:                                           

Rate:         89                                     

CO:           136                                    

QRSD:         86                                     

QT:           384                                    

QTc:          467                                    

Axis:                                                

P:            32                                     

CO:           136                                    

QRS:          30                                     

T:            18                                     

                                                     

INTERPRETIVE STATEMENTS:                                       

                                                     

Normal sinus rhythm

Normal ECG

Compared to ECG 12/17/2022 19:43:57

Atrial abnormality no longer present

Myocardial infarct finding no longer present



Electronically Signed On 12-21-22 14:00:31 CST by Mick Travis

## 2022-12-21 NOTE — RAD REPORT
EXAM DESCRIPTION:  RAD - Upper GI W/Air Cont W/Sm Bowel - 12/20/2022 2:59 pm

 

CLINICAL HISTORY:  Achalasia, nausea and vomiting

 

FINDINGS:  The esophagus is normal caliber. A stricture is not seen.

 

The esophagus, stomach and duodenum appear unremarkable. No gastroesophageal reflux

 

Contrast enters the colon by approximately 1 hour.

 

Mucosal folds of the small bowel are normal. The small bowel caliber normal .

 

No permanent filling defects, obstructing or constricting lesions seen.

 

Twenty fluoroscopic spot images obtained. Fluoroscopy time 53 seconds

 

IMPRESSION:  Unremarkable upper GI

 

Unremarkable small bowel series

## 2022-12-22 VITALS — SYSTOLIC BLOOD PRESSURE: 168 MMHG | TEMPERATURE: 98.2 F | DIASTOLIC BLOOD PRESSURE: 78 MMHG

## 2022-12-22 LAB
BUN BLD-MCNC: 6 MG/DL (ref 7–18)
GLUCOSE SERPLBLD-MCNC: 104 MG/DL (ref 74–106)
MAGNESIUM SERPL-MCNC: 1.5 MG/DL (ref 1.6–2.4)
POTASSIUM SERPL-SCNC: 3.7 MMOL/L (ref 3.5–5.1)

## 2022-12-22 RX ADMIN — HYDRALAZINE HYDROCHLORIDE PRN MG: 20 INJECTION INTRAMUSCULAR; INTRAVENOUS at 12:03

## 2022-12-22 RX ADMIN — PANTOPRAZOLE SODIUM SCH MG: 40 TABLET, DELAYED RELEASE ORAL at 08:33

## 2022-12-22 RX ADMIN — LOSARTAN POTASSIUM AND HYDROCHLOROTHIAZIDE SCH TAB: 50; 12.5 TABLET, FILM COATED ORAL at 08:28

## 2022-12-22 RX ADMIN — SODIUM CHLORIDE SCH MLS: 0.9 INJECTION, SOLUTION INTRAVENOUS at 02:39

## 2022-12-22 RX ADMIN — METOPROLOL TARTRATE SCH MG: 50 TABLET, FILM COATED ORAL at 05:43

## 2022-12-22 RX ADMIN — Medication SCH ML: at 09:59

## 2022-12-22 RX ADMIN — CIPROFLOXACIN SCH MG: 500 TABLET, FILM COATED ORAL at 08:28

## 2022-12-22 NOTE — P.DS
Admission Date: 12/17/22


Discharge Date: 12/22/22


Disposition: ROUTINE DISCHARGE


Discharge Condition: GOOD


Reason for Admission: Intractable Vomiting


Consultations: 


1. Gastroenterology


2. General Surgery


Procedures: 


- 12/20/2022 - Esophagogastroduodenoscopy


Hospital Course: 





DIAGNOSES:


# Intractable Nausea/Vomiting


# History of Peptic Ulcer Disease


# Cholelithiasis


# Small-Moderate Hiatal Hernia


# Hypertensive Urgency


# Proximal Sigmoid Colonic Mass (4 cm)


# Left Renal Cyst (4.1 cm)





HOSPITAL COURSE:


Ms. Shawnee Hall is a pleasant 67-year-old female with a past medical history

significant for peptic ulcer disease and hypertension who was admitted to the 

[facility] on 12/17/2022 for intractable naus

ea/vomiting.





She was admitted to the Medicine service. She was treated with antiemetics, 

without alleviation of her symptoms. Her CT abdomen/pelvis revealed, "4 

centimeter apparent soft tissue structure proximal sigmoid colon may represent 

neoplasm. Another consideration is that this is secondary to mild 

diverticulitis. Cholelithiasis." She has been aware of this colonic lesion prior

to admission, and was scheduled to have a colonoscopy on 12/20/2022; however, 

she did not feel well enough to be discharged on that date, so she missed this 

appointment. Gastroenterology was consulted while she was hospitalized, and she 

was evaluated by Dr. Krishna. He felt that the colonic mass was unlikely to be 

the cause of her nausea and vomiting. While hospitalized, he performed an 

esophagogastroduodenoscopy, which did not reveal a clear etiology of her 

symptoms. He recommended that she have further evaluation for possible 

symptomatic cholelithiasis. General Surgery was consulted and she was evaluated 

by Dr. Damico. HIDA scan was obtained which revealed, "No evidence of acute 

cholecystitis. Gallbladder ejection fraction equals 36% which is lower limits 

normal." Dr. Damico recommended an elective cholecystectomy as an outpatient 

if she chooses to proceed. Over the course of her hospitalization, her symptoms 

improved significantly and she was able to tolerate a diet. She has been cleared

for discharge by the Gastroenterology and General Surgery services. Dr. Krishna

has recommended that she follow-up with him in clinic next week to have a 

colonoscopy and biopsies. She has agreed to make this appointment.





On 12/22/2022, she was seen on morning rounds and deemed medically stable for 

discharge.  She was discharged with instructions to schedule follow-up 

appointments with her PCP (GINA Sousa), with Gastroenterology (Dr. Krishna), and 

with General Surgery (Dr. Damico). She was provided prescriptions for 

ciprofloxacin, metronidazole, ondansetron, and promethazine. She and her daugh

ter (Ms. Hermosillo) were given the opportunity to ask questions and reported no 

further questions. Furthermore, all questions were answered to the best of my 

ability.





A copy of this discharge summary will be sent to the above providers to 

facilitate continuity of care.





Today, I personally spent 35 minutes on her case, of which greater than 50% of 

the time was spent in patient education, counseling, and coordination of care as

described above.








- Physical Exam


General: Alert, In no apparent distress, Oriented x3


HEENT: Atraumatic, Mucous membr. moist/pink, EOMI, Sclerae nonicteric


Neck: JVD not distended


Respiratory: Clear to auscultation bilaterally, Normal air movement


Cardiovascular: No edema, Regular rate/rhythm, Normal S1 S2, No gallops, No 

rubs, No murmurs


Gastrointestinal: Normal bowel sounds, Soft and benign, Non-distended, No 

tenderness, No rebound, No guarding


Musculoskeletal: No clubbing


Integumentary: No rashes


Neurological: Normal speech, Normal affect


Vital Signs/Physical Exam: 














Temp Pulse Resp BP Pulse Ox


 


 98.2 F   72   16   168/78 H  99 


 


 12/22/22 12:00  12/22/22 12:00  12/22/22 12:00  12/22/22 12:00  12/22/22 12:00








Laboratory Data at Discharge: 














WBC  9.90 K/uL (4.3-10.9)   12/21/22  03:03    


 


Hgb  9.9 g/dL (12.0-15.0)  L  12/21/22  03:03    


 


Hct  31.2 % (36.0-45.0)  L  12/21/22  03:03    


 


Plt Count  302 K/uL (152-406)   12/21/22  03:03    


 


Sodium  135 mmol/L (136-145)  L  12/22/22  06:01    


 


Potassium  3.7 mmol/L (3.5-5.1)   12/22/22  06:01    


 


BUN  6 mg/dL (7-18)  L  12/22/22  06:01    


 


Creatinine  0.63 mg/dL (0.55-1.02)   12/22/22  06:01    


 


Glucose  104 mg/dL ()   12/22/22  06:01    


 


Phosphorus  2.3 mg/dL (2.5-4.9)  L  12/22/22  06:01    


 


Magnesium  1.5 mg/dL (1.6-2.4)  L  12/22/22  06:01    


 


Total Bilirubin  0.3 mg/dL (0.2-1.0)   12/17/22  17:35    


 


AST  18 U/L (15-37)   12/17/22  17:35    


 


ALT  20 U/L (13-56)   12/17/22  17:35    


 


Alkaline Phosphatase  93 U/L ()   12/17/22  17:35    


 


Lipase  60 U/L ()  L  12/17/22  17:35    








Home Medications: 








Losartan/Hydrochlorothiazide [Losartan-Hctz 100-25 mg Tab] 1 tab PO DAILY 

12/18/22 


Ciprofloxacin HCl [Cipro] 500 mg PO BID 5 Days #10 tab 12/22/22 


Ondansetron [Zofran (Odt)*] 4 mg PO Q8HP PRN 7 Days #20 tab 12/22/22 


Promethazine Tab [Phenergan] 12.5 mg PO Q6HP PRN 7 Days #15 tab 12/22/22 


metroNIDAZOLE [Flagyl*] 500 mg PO TID 5 Days #15 tab 12/22/22 





New Medications: 


Promethazine Tab [Phenergan] 12.5 mg PO Q6HP PRN 7 Days #15 tab


 PRN Reason: Nausea / Vomiting


Ondansetron [Zofran (Odt)*] 4 mg PO Q8HP PRN 7 Days #20 tab


 PRN Reason: Nausea / Vomiting


Ciprofloxacin HCl [Cipro] 500 mg PO BID 5 Days #10 tab


metroNIDAZOLE [Flagyl*] 500 mg PO TID 5 Days #15 tab


Physician Discharge Instructions: 


1. Please call and schedule a follow-up appointment with your PCP (NP Lars) in 

3-5 days


   - Please follow-up the cyst on your kidney


2. Please call and schedule a follow-up appointment with Gastroenterology (Dr. Krishna) in 3-5 days


   - You will need to call and schedule a colonoscopy next week to evaluate the 

mass in your colon


3. Please call and schedule a follow-up appointment with General Surgery (Dr. Damico) in 1-2 weeks


   - Please discuss the possible gallbladder removal surgery at this appointment


Diet: [place]


Activity: Ad arslan


Followup: 


Adeel Damico MD [ACTIVE - CAN ADMIT] - 


James Krishna MD [ACTIVE - CAN ADMIT] - 


Roque Sousa FNP [Primary Care Provider] - 


Time spent managing pt's care (in minutes): 35

## 2023-01-18 ENCOUNTER — HOSPITAL ENCOUNTER (INPATIENT)
Dept: HOSPITAL 97 - ER | Age: 68
LOS: 2 days | Discharge: HOME | DRG: 641 | End: 2023-01-20
Attending: INTERNAL MEDICINE | Admitting: INTERNAL MEDICINE
Payer: COMMERCIAL

## 2023-01-18 VITALS — BODY MASS INDEX: 29.9 KG/M2

## 2023-01-18 DIAGNOSIS — Z20.822: ICD-10-CM

## 2023-01-18 DIAGNOSIS — Z88.8: ICD-10-CM

## 2023-01-18 DIAGNOSIS — E87.1: Primary | ICD-10-CM

## 2023-01-18 DIAGNOSIS — I10: ICD-10-CM

## 2023-01-18 DIAGNOSIS — K80.20: ICD-10-CM

## 2023-01-18 DIAGNOSIS — N28.1: ICD-10-CM

## 2023-01-18 DIAGNOSIS — Z79.899: ICD-10-CM

## 2023-01-18 DIAGNOSIS — E86.0: ICD-10-CM

## 2023-01-18 DIAGNOSIS — K21.9: ICD-10-CM

## 2023-01-18 DIAGNOSIS — D50.9: ICD-10-CM

## 2023-01-18 DIAGNOSIS — E86.1: ICD-10-CM

## 2023-01-18 DIAGNOSIS — K44.9: ICD-10-CM

## 2023-01-18 DIAGNOSIS — K63.9: ICD-10-CM

## 2023-01-18 DIAGNOSIS — E87.6: ICD-10-CM

## 2023-01-18 DIAGNOSIS — D72.829: ICD-10-CM

## 2023-01-18 LAB
ALBUMIN SERPL BCP-MCNC: 3.5 G/DL (ref 3.4–5)
ALP SERPL-CCNC: 91 U/L (ref 45–117)
ALT SERPL W P-5'-P-CCNC: 18 U/L (ref 13–56)
AST SERPL W P-5'-P-CCNC: 25 U/L (ref 15–37)
BUN BLD-MCNC: 12 MG/DL (ref 7–18)
GLUCOSE SERPLBLD-MCNC: 114 MG/DL (ref 74–106)
HCT VFR BLD CALC: 40.7 % (ref 36–45)
LIPASE SERPL-CCNC: 267 U/L (ref 73–393)
LYMPHOCYTES # SPEC AUTO: 2.2 K/UL (ref 0.7–4.9)
MCV RBC: 77.9 FL (ref 80–100)
PMV BLD: 8.4 FL (ref 7.6–11.3)
POTASSIUM SERPL-SCNC: 2.5 MMOL/L (ref 3.5–5.1)
RBC # BLD: 5.23 M/UL (ref 3.86–4.86)

## 2023-01-18 PROCEDURE — 99285 EMERGENCY DEPT VISIT HI MDM: CPT

## 2023-01-18 PROCEDURE — 84100 ASSAY OF PHOSPHORUS: CPT

## 2023-01-18 PROCEDURE — 96375 TX/PRO/DX INJ NEW DRUG ADDON: CPT

## 2023-01-18 PROCEDURE — 87811 SARS-COV-2 COVID19 W/OPTIC: CPT

## 2023-01-18 PROCEDURE — 80048 BASIC METABOLIC PNL TOTAL CA: CPT

## 2023-01-18 PROCEDURE — 96365 THER/PROPH/DIAG IV INF INIT: CPT

## 2023-01-18 PROCEDURE — 85025 COMPLETE CBC W/AUTO DIFF WBC: CPT

## 2023-01-18 PROCEDURE — 36415 COLL VENOUS BLD VENIPUNCTURE: CPT

## 2023-01-18 PROCEDURE — 96366 THER/PROPH/DIAG IV INF ADDON: CPT

## 2023-01-18 PROCEDURE — 83735 ASSAY OF MAGNESIUM: CPT

## 2023-01-18 PROCEDURE — 74176 CT ABD & PELVIS W/O CONTRAST: CPT

## 2023-01-18 PROCEDURE — 84550 ASSAY OF BLOOD/URIC ACID: CPT

## 2023-01-18 PROCEDURE — 96361 HYDRATE IV INFUSION ADD-ON: CPT

## 2023-01-18 PROCEDURE — 81003 URINALYSIS AUTO W/O SCOPE: CPT

## 2023-01-18 PROCEDURE — 83690 ASSAY OF LIPASE: CPT

## 2023-01-18 PROCEDURE — 76377 3D RENDER W/INTRP POSTPROCES: CPT

## 2023-01-18 PROCEDURE — 83935 ASSAY OF URINE OSMOLALITY: CPT

## 2023-01-18 PROCEDURE — 80053 COMPREHEN METABOLIC PANEL: CPT

## 2023-01-18 PROCEDURE — 84300 ASSAY OF URINE SODIUM: CPT

## 2023-01-18 PROCEDURE — 83930 ASSAY OF BLOOD OSMOLALITY: CPT

## 2023-01-18 PROCEDURE — 84132 ASSAY OF SERUM POTASSIUM: CPT

## 2023-01-18 NOTE — XMS REPORT
Continuity of Care Document

                           Created on:2023



Patient:ROLANDO FOSTER

Sex:Female

:1955

External Reference #:[id number]





Demographics







                          Address                   [address]



                                                    [place] [address]

 

                          Home Phone                [phone]

 

                          Work Phone                [phone]

 

                          Email Address             [email or web address]

 

                          Preferred Language        English

 

                          Marital Status            Unknown

 

                          Caodaism Affiliation     Unknown

 

                          Race                      Unknown

 

                          Additional Race(s)        Unavailable



                                                    Unavailable

 

                          Ethnic Group              Unknown









Author







                          Organization              [facility]

t

 

                          Address                   [address]



                                                    [place] [address]

 

                          Phone                     [phone]









Support







                Name            Relationship    Address         Phone

 

                1               OLAMIDE          Unavailable     Unavailable

 

                2               Unavailable     Unavailable     Unavailable









Care Team Providers







                    Name                Role                Phone

 

                    ISABELLE CINTRON Attending Clinician Unavailable

 

                    TRACEE SOOD      Attending Clinician Unavailable

 

                    ROYA AMAYA    Attending Clinician Unavailable









Payers







           Payer Name Policy Type Policy Number Effective Date Expiration Date S

ourrosemarie

 

           [employer]            [id number]  2020            



           PPO                              00:00:00              

 

           [facility]            [id number] 2017            



           [facility]                         00:00:00              







Problems

This patient has no known problems.



Allergies, Adverse Reactions, Alerts







       Allergy Allergy Status Severity Reaction(s) Onset  Inactive Treating Comm

ents 

Source



       Name   Type                        Date   Date   Clinician        

 

       [facility]   Active Med    N/V                          [facility] [place]                      3-21                        ity of



                                          00:00:                      [address]







Medications

This patient has no known medications.



Procedures

This patient has no known procedures.



Encounters







        Start   End     Encounter Admission Attending Care    Care    Encounter 

Source



        Date/Time Date/Time Type    Type    Clinicians Facility Department ID   

   

 

        2021 Outpatient         NICHOLAS CINTRON  [id number]

[id number]



        09:30:00 09:30:00                 ISABELLE [email or web address]

 

        2020 Outpatient R       BARRIE  [facility]    [id number]

[address]



        14:00:00 14:00:00                 TRACEE                         [facility]

 

        2020 Outpatient R       JOSE LUIS [facility]    [id number]

[address]



        10:20:00 10:20:00                 ROYA                          [facility]

 

        2020 Outpatient R               [facility]    [id number]

[address]



        17:00:00 17:00:00                                                 [facility]

 

        2020 2020 Outpatient R               [facility]    [id number]

[address]



        09:10:00 09:10:00                                                 [facility]







Results

This patient has no known results.

## 2023-01-18 NOTE — EDPHYS
Physician Documentation                                                                           

 [facility]                                                                 

Name: Shawnee Hall                                                                             

Age: 67 yrs                                                                                       

Sex: Female                                                                                       

: 1955                                                                                   

MRN: [id number]                                                                                   

Arrival Date: 2023                                                                          

Time: 16:32                                                                                       

Account#: [id number]                                                                            

Bed 18                                                                                            

Private MD:                                                                                       

ED Physician Juni Qureshi                                                                       

HPI:                                                                                              

                                                                                             

18:35 This 67 yrs old Black Female presents to ER via Wheelchair with complaints of           snw 

      Nausea/Vomiting.                                                                            

18:35 The patient presents to the emergency department with nausea, vomiting. Onset: The      snw 

      symptoms/episode began/occurred 2 month(s) ago, and became persistent. The symptoms are     

      aggravated by food . Severity of symptoms: At their worst the symptoms were moderate.       

      The patient has experienced similar episodes in the past, multiple times. as noted.         

                                                                                                  

Historical:                                                                                       

- Allergies:                                                                                      

17:09 Ibuprofen;                                                                              iw  

- Home Meds:                                                                                      

17:09 losartan-hydrochlorothiazide 100-25 mg Oral tab 1 tab once daily [Active];              iw  

- PMHx:                                                                                           

17:09 Hypertensive disorder;                                                                  iw  

                                                                                                  

                                                                                                  

                                                                                                  

ROS:                                                                                              

18:32 Constitutional: Negative for fever, chills, and weight loss, Eyes: Negative for injury, snw 

      pain, redness, and discharge, ENT: Negative for injury, pain, and discharge, Neck:          

      Negative for injury, pain, and swelling, Cardiovascular: Negative for chest pain,           

      palpitations, and edema, Respiratory: Negative for shortness of breath, cough,              

      wheezing, and pleuritic chest pain, Back: Negative for injury and pain, : Negative        

      for injury, bleeding, discharge, and swelling, MS/Extremity: Negative for injury and        

      deformity, Skin: Negative for injury, rash, and discoloration, Neuro: Negative for          

      headache, weakness, numbness, tingling, and seizure, Psych: Negative for depression,        

      anxiety, suicide ideation, homicidal ideation, and hallucinations.                          

18:32 Abdomen/GI: Positive for nausea and vomiting.                                               

                                                                                                  

Exam:                                                                                             

18:31 Constitutional:  This is a well developed, well nourished patient who is awake, alert,  snw 

      and in no acute distress. Head/Face:  Normocephalic, atraumatic. Eyes:  Pupils equal        

      round and reactive to light, extra-ocular motions intact.  Lids and lashes normal.          

      Conjunctiva and sclera are non-icteric and not injected.  Cornea within normal limits.      

      Periorbital areas with no swelling, redness, or edema. ENT:  Nares patent. No nasal         

      discharge, no septal abnormalities noted.  Tympanic membranes are normal and external       

      auditory canals are clear.  Oropharynx with no redness, swelling, or masses, exudates,      

      or evidence of obstruction, uvula midline.  Mucous membranes moist. Neck:  Trachea          

      midline, no thyromegaly or masses palpated, and no cervical lymphadenopathy.  Supple,       

      full range of motion without nuchal rigidity, or vertebral point tenderness.  No            

      Meningismus. Chest/axilla:  Normal chest wall appearance and motion.  Nontender with no     

      deformity.  No lesions are appreciated. Cardiovascular:  Regular rate and rhythm with a     

      normal S1 and S2.  No gallops, murmurs, or rubs.  Normal PMI, no JVD.  No pulse             

      deficits. Respiratory:  Lungs have equal breath sounds bilaterally, clear to                

      auscultation and percussion.  No rales, rhonchi or wheezes noted.  No increased work of     

      breathing, no retractions or nasal flaring. Back:  No spinal tenderness.  No                

      costovertebral tenderness.  Full range of motion. Skin:  Warm, dry with normal turgor.      

      Normal color with no rashes, no lesions, and no evidence of cellulitis. MS/ Extremity:      

      Pulses equal, no cyanosis.  Neurovascular intact.  Full, normal range of motion. Neuro:     

       Awake and alert, GCS 15, oriented to person, place, time, and situation.  Cranial          

      nerves II-XII grossly intact.  Motor strength 5/5 in all extremities.  Sensory grossly      

      intact.  Cerebellar exam normal.  Normal gait. Psych:  Awake, alert, with orientation       

      to person, place and time.  Behavior, mood, and affect are within normal limits.            

18:31 Abdomen/GI: Inspection: distension, that is mild, that is moderate, Bowel sounds:           

      active, Palpation: mild abdominal tenderness, in the right upper quadrant and left          

      upper quadrant.                                                                             

                                                                                                  

Vital Signs:                                                                                      

17:07  / 52; Pulse 92; Resp 16; Temp 98.4(O); Pulse Ox 100% on R/A; Weight 69.85 kg;    iw  

      Height 5 ft. 1 in. (154.94 cm); Pain 5/10;                                                  

21:00  / 58; Pulse 81; Resp 22; Pulse Ox 10% ;                                          vc1 

22:15  / 46; Pulse 88; Resp 17; Pulse Ox 100% ;                                         vc1 

23:30  / 75; Pulse 87; Resp 22; Pulse Ox 98% on R/A;                                    vc1 

17:07 Body Mass Index 29.10 (69.85 kg, 154.94 cm)                                             iw  

                                                                                                  

MDM:                                                                                              

17:13 Patient medically screened.                                                             snw 

18:32 Differential diagnosis: Nonspecific abd pain, gastritis, cholecystitis, diverticulitis. snw 

      Data reviewed: vital signs, nurses notes. I considered the following discharge              

      prescriptions or medication management in the emergency department Medications were         

      administered in the Emergency Department. See MAR. Historians other than the Patient:       

      Spouse/Significant Other: . External Records Reviewed: Inpatient record: Saw Dr. Krishna 22, Dr. Damico 22. Pt was admitted for intractable N/V. Hida         

      scan showed GB EF 36% low norm. Pt had colonoscopy 23 with Dr Krishna, noted area     

      of interest was not visualized well. Pt is to have longer prep and repeat colonoscopy       

      23. Pt is without s/s change at this time but feels dehydrated and would like some     

      fluids. Pt last episode of vomiting today at 1530, said to be bile in nature..              

                                                                                                  

                                                                                             

18:03 Order name: CBC with Diff; Complete Time: 19:13                                         snw 

                                                                                             

18:03 Order name: CMP; Complete Time: 19:46                                                   snw 

                                                                                             

18:03 Order name: Lipase; Complete Time: 19:46                                                snw 

                                                                                             

19:59 Order name: Urine Osmolality                                                            la 

                                                                                             

19:59 Order name: Osmolality, Serum; Complete Time: 00:32                                     la1 

                                                                                             

19:59 Order name: Urine Potassium Random                                                                                                                                                   

17:32 Order name: CT Stone Protocol; Complete Time: 18:30                                     snw 

                                                                                             

19:59 Order name: Urine Sodium Random                                                                                                                                                      

19:59 Order name: Add On-Lab                                                                  w 

                                                                                             

20:12 Order name: Magnesium; Complete Time: 20:41                                             EDMS

                                                                                             

20:19 Order name: SARS RAPID; Complete Time: 23:20                                            snw 

                                                                                             

18:03 Order name: IV Saline Lock; Complete Time: 18:53                                        snw 

                                                                                             

18:04 Order name: Labs collected and sent; Complete Time: 18:53                               snw 

                                                                                             

19:03 Order name: Labs - recollect needed: recollect green top; Complete Time: 19:17          bd  

                                                                                                  

Administered Medications:                                                                         

17:51 Drug: Phenergan (promethazine) 25 mg Route: PO;                                         5 

18:55 Drug: NS 0.9% 500 ml Route: IV; Rate: bolus; Site: left antecubital;                    5 

20:00 Follow up: IV Status: Completed infusion; IV Intake: 500ml                              1 

19:59 CANCELLED (Other Intervention Used): NS 0.9% 1000 ml IV at 125 ml/hr continuous         la1 

20:15 Drug: fentaNYL (PF) 25 mcg Route: IVP; Site: left antecubital;                          5 

22:00 Follow up: Response: No adverse reaction; Marked relief of symptoms; Pain is decreased  vc1 

20:15 Drug: Potassium Chloride 20 mEq Route: IV; Rate: calculated rate; Site: left            [facility] 

      [facility];                                                                                

22:15 Follow up: Response: No adverse reaction; IV Status: Completed infusion; IV Intake:     vc1 

      100ml                                                                                       

20:15 Drug: Potassium Chloride 40 mEq Route: PO;                                              [facility] 

                                                                                             

00:20 Follow up: Response: No adverse reaction; No change in condition                        1 

                                                                                             

20:19 CANCELLED (we no have): Erythrocin (ERYTHromycin) 500 mg PO once                        [facility] 

20:40 Drug: Ambien (zolpidem) 5 mg Route: PO;                                                 1 

                                                                                             

00:19 Follow up: Response: No adverse reaction                                                1 

                                                                                             

21:33 Drug: NS 0.9% 250 ml Route: IV; Rate: calculated rate; Site: left antecubital;          [facility] 

                                                                                             

00:19 Follow up: IV Status: Completed infusion; IV Intake: 500ml                              1 

                                                                                             

22:11 Drug: Ambien (zolpidem) 5 mg Route: PO;                                                 vc1 

23:00 Follow up: Response: No adverse reaction                                                vc1 

22:11 Drug: ProTONIX (pantoprazole) 40 mg Route: IVP; Site: left antecubital;                 1 

23:00 Follow up: Response: No adverse reaction; Marked relief of symptoms                     vc1 

22:51 Drug: Potassium Chloride 20 mEq Route: IV; Rate: calculated rate; Site: left            [facility] 

      antecubital;                                                                                

                                                                                             

00:19 Follow up: IV Status: Infusion continued upon admission                                 vc1 

                                                                                                  

                                                                                                  

Disposition:                                                                                      

12:35 Co-signature as Attending Physician, Juni Qureshi MD I agree with the assessment and   kdr 

      plan of care.                                                                               

                                                                                                  

Disposition Summary:                                                                              

23 19:58                                                                                    

Hospitalization Ordered                                                                           

      Location: [facility] (Inpatient)                                                 snw 

      Condition: Stable                                                                       snw 

      Problem: an acute exacerbation                                                          snw 

      Symptoms: have worsened                                                                 snw 

      Bed/Room Type: Standard                                                                 snw 

      Provider: Dani Thompson(23 19:58)                                                 snw 

      Hospitalization Status: Observation(23 20:00)                                     snw 

      Room Assignment: 209(23 23:14)                                                    eb1 

      Diagnosis                                                                                   

        - Other disorders of electrolyte and fluid balance, not elsewhere classified          snw 

        - Nausea with vomiting, unspecified                                                   snw 

      Forms:                                                                                      

        - Medication Reconciliation Form                                                      snw 

        - SBAR form                                                                           snw 

Signatures:                                                                                       

Dispatcher [employer]                           EDRenea Figueroa Kevin, MD MD   kdr                                                  

Joann Ruiz, FNP-C                   FNP-Csnw                                                  

Tamara Torres, RN                     ARACELIS   iw                                                   

Benjie Garcia, FNP-C                      FNP-Cla1                                                  

Leodan France RN                       RN   jb4                                                  

Edwige Winter RN                     RN   eb1                                                  

Jen Hayes RN                       RN   jh5                                                  

Fátima Alves, RN                    RN   vc1                                                  

                                                                                                  

Corrections: (The following items were deleted from the chart)                                    

                                                                                             

19:58 19:58 Pelon Gutierrez snw                                                                snw 

19:59 19:46 NS 0.9% 1000 ml IV at 125 ml/hr continuous ordered. snw                           la1 

20:00 19:58 Inpatient Admission snw                                                           snw 

20:19 19:43 Erythrocin (ERYTHromycin) 500 mg PO once ordered. snw                             jh5 

23:14 19:58 snw                                                                               eb1 

                                                                                                  

**************************************************************************************************

## 2023-01-18 NOTE — P.HP
Certification for Inpatient


Patient admitted to: Inpatient


With expected LOS: >2 Midnights


Patient will require the following post-hospital care: None


Practitioner: I am a practitioner with admitting privileges, knowledge of 

patient current condition, hospital course, and medical plan of care.


Services: Services provided to patient in accordance with Admission requirements

found in Title 42 Section 412.3 of the Code of Federal Regulations





<Benjie Garcia - Last Filed: 01/18/23 21:57>





Patient History


Date of Service: 01/18/23


Reason for admission: Hyponatremia, hypokalemia


History of Present Illness: 





67-year-old female with history of hypertension, recently diagnosed proximal 

sigmoid colonic mass, GERD presents to the emergency department for intractable 

nausea and vomiting.  She reports that she had a colonoscopy performed on 

1/12/2023, unfortunately as procedure they were unable to fully visualize the 

mass to evaluate, since then patient has had intermittent nausea, vomiting.  She

also takes losartan/hydrochlorothiazide at home, she was evaluated in the 

emergency department found to have hyponatremia with a sodium of 122 potassium 

of 2.5 chloride 84 leukocytosis with a white blood cell count of 15.6, no source

of infection identified.  In the emergency department patient was given 500 cc 

NS bolus as well as 40 of IV potassium and 40 of p.o. potassium.  Will admit for

further valuation and management of hyponatremia, hypokalemia, intractable 

nausea/vomiting.





- Past Medical/Surgical History


Diabetic: No


-: Hypertension


-: Sigmoid mass


-: left knee sx


Psychosocial/ Personal History: Patient lives at home with her family.





- Family History


  ** Mother


-: Cancer


Notes: Colon Cancer





- Social History


Alcohol use: No


CD- Drugs: No


Caffeine use: No


Place of Residence: Home





<Benjie Garcia - Last Filed: 01/18/23 21:57>


Date of Service: 01/19/23





<Dani Thompson - Last Filed: 01/19/23 18:24>


Allergies





NSAIDS (Non-Steroidal Anti-Inflamma Adverse Reaction (Verified 01/19/23 01:57)


   bleeding





Home Medications: 








RX: Losartan/Hydrochlorothiazide [Losartan-Hctz 100-25 mg Tab] 1 tab PO DAILY 

12/18/22 


Promethazine Tab [Phenergan] 12.5 mg PO Q6HP PRN 7 Days #15 tab 12/22/22 


RX: Ondansetron [Zofran (Odt)*] 4 mg PO Q8HP PRN 7 Days #20 tab 12/22/22 


Zolpidem Tartrate [Ambien] 10 mg PO BEDTIME 01/19/23 








Review of Systems


10-point ROS is otherwise unremarkable


Gastrointestinal: Nausea, Vomiting





<Benjie Garcia - Last Filed: 01/18/23 21:57>





Physical Examination





- Physical Exam


General: Alert, In no apparent distress, Oriented x3


HEENT: Atraumatic, PERRLA, Mucous membr. moist/pink, EOMI, Sclerae nonicteric


Neck: Supple, 2+ carotid pulse no bruit, No LAD, Without JVD or thyroid 

abnormality


Respiratory: Clear to auscultation bilaterally, Normal air movement


Cardiovascular: Regular rate/rhythm, Normal S1 S2


Capillary refill: <2 Seconds


Gastrointestinal: Normal bowel sounds, No tenderness


Musculoskeletal: No tenderness


Integumentary: No rashes


Neurological: Normal speech, Normal strength at 5/5 x4 extr, Normal tone, Normal

 affect





- Studies


Laboratory Data (last 24 hrs)





01/18/23 19:14: Sodium 122 L, Potassium 2.5 L*, BUN 12, Creatinine 1.05 H, 

Glucose 114 H, Total Bilirubin 0.8, AST 25, ALT 18, Alkaline Phosphatase 91, 

Lipase 267


01/18/23 18:52: Magnesium 1.8


01/18/23 18:52: WBC 15.60 H, Hgb 13.3, Hct 40.7, Plt Count 325








<Benjie Garcia - Last Filed: 01/18/23 21:57>





- Studies


Laboratory Data (last 24 hrs)





01/18/23 19:14: Sodium 122 L, Potassium 2.5 L*, BUN 12, Creatinine 1.05 H, 

Glucose 114 H, Total Bilirubin 0.8, AST 25, ALT 18, Alkaline Phosphatase 91, 

Lipase 267


01/18/23 18:52: Magnesium 1.8


01/18/23 18:52: WBC 15.60 H, Hgb 13.3, Hct 40.7, Plt Count 325








<Dani Thompson - Last Filed: 01/19/23 18:24>





Assessment and Plan





- Plan


Assessment:


Severe hyponatremia, hypokalemia


Intractable nausea/vomiting


Hypertension


Sigmoid colonic mass








Plan:


Severe hyponatremia, hypokalemia: Likely multifactorial given intractable 

nausea/vomiting, use of losartan/hydrochlorothiazide, recent colonoscopy prep.  

Patient given NS bolus as well as potassium replacement ED, will hold off on 

additional IV fluids until repeat chemistry at midnight in order to avoid 

overcorrection.


Intractable nausea/vomiting: N.p.o. for tonight, twice daily IV Protonix.


Hypertension: Hold losartan/HCTZ, likely DC HCTZ at discharge.


Sigmoid colonic mass: Patient has repeat colonoscopy scheduled for 1/30/2023 as 

they were unable to completely visualize colonic mass during previous co

lonoscopy.





DVT PPX: Lovenox


Code status: Full


Discharge Plan: Home


Plan to discharge in: 48 Hours





- Advance Directives


Does patient have a Living Will: No


Does patient have a Durable POA for Healthcare: No





- Code Status/Comfort Care


Code Status Assessed: Yes (Full code)


Critical Care: No


Time Spent Managing Pts Care (In Minutes): 55





<Benjie Garcia - Last Filed: 01/18/23 21:57>


Physician Review: Patient Assessed, Agree with Above Assessment and Plan





<aDni Thompson - Last Filed: 01/19/23 18:24>
02-May-2022 16:11

## 2023-01-18 NOTE — RAD REPORT
EXAM DESCRIPTION:  CT - Stone Protocol - 1/18/2023 5:59 pm

 

CLINICAL HISTORY:  Abdominal pain.

 

COMPARISON:  December 2022

 

TECHNIQUE:  Computed axial tomography of the abdomen pelvis was obtained without oral or IV contrast.
 Lack of IV and oral contrast limits evaluation of solid organs, appendix, bowel, and vessels. Goodrich
l reformatted images were obtained and reviewed.

 

All CT scans are performed using dose optimization technique as appropriate and may include automated
 exposure control or mA/KV adjustment according to patient size.

 

FINDINGS:  A renal calculus is not seen. An ureteral calculus is not noted. A bladder calculus is not
 present. 4 centimeter left parapelvic renal cyst. No hydronephrosis

 

The liver, spleen, pancreas and adrenals appear grossly normal

 

There is no evidence of diverticulitis. The appendix appears normal

 

Calcified uterine fibroids.

 

Cholelithiasis without gallbladder wall thickening. Small umbilical hernia. Small to moderate hiatal 
hernia

 

Diverticula stem from the colon. The stranding adjacent to the proximal sigmoid colon has diminished.
 Possible mass is again demonstrated

 

IMPRESSION:  Negative for a genitourinary calculus

 

Cholelithiasis without evidence of cholecystitis

 

The inflammation surrounding the proximal sigmoid colon has diminished since the prior exam. Possible
 mass within the proximal sigmoid colon is again demonstrated

## 2023-01-18 NOTE — ER
Nurse's Notes                                                                                     

 [facility]                                                                 

Name: Shawnee Hall                                                                             

Age: 67 yrs                                                                                       

Sex: Female                                                                                       

: 1955                                                                                   

MRN: [id number]                                                                                   

Arrival Date: 2023                                                                          

Time: 16:32                                                                                       

Account#: [id number]                                                                            

Bed 18                                                                                            

Private MD:                                                                                       

Diagnosis: Other disorders of electrolyte and fluid balance, not elsewhere classified;Nausea with 

  vomiting, unspecified                                                                           

                                                                                                  

Presentation:                                                                                     

                                                                                             

17:07 Chief complaint: Patient states: nausea since Friday , had a colonoscopy Thursday, also iw  

      vomiting ,also has stomach pains that are uncomfortable. Coronavirus screen: At this        

      time, the client does not indicate any symptoms associated with coronavirus-19. Ebola       

      Screen: Patient negative for fever greater than or equal to 101.5 degrees Fahrenheit,       

      and additional compatible Ebola Virus Disease symptoms Patient denies exposure to           

      infectious person. Patient denies travel to an Ebola-affected area in the 21 days           

      before illness onset. No symptoms or risks identified at this time. Initial Sepsis          

      Screen: Does the patient meet any 2 criteria? No. Patient's initial sepsis screen is        

      negative. Does the patient have a suspected source of infection? No. Patient's initial      

      sepsis screen is negative. Risk Assessment: Do you want to hurt yourself or someone         

      else? Patient reports no desire to harm self or others. Onset of symptoms was 2023.                                                                                   

17:07 Method Of Arrival: Wheelchair                                                           iw  

17:07 Acuity: JASON 3                                                                           iw  

                                                                                                  

Triage Assessment:                                                                                

19:00 General: Appears in no apparent distress. uncomfortable, Behavior is calm, cooperative, vc1 

      appropriate for age. Pain: Complains of pain in abdomen Pain does not radiate. EENT: No     

      deficits noted. Neuro: Level of Consciousness is awake, alert, obeys commands, Oriented     

      to person, place, time, situation. Cardiovascular: No deficits noted. Respiratory: No       

      deficits noted. GI: Reports lower abdominal pain, upper abdominal pain, nausea,             

      vomiting. : No deficits noted. Derm: No deficits noted. Musculoskeletal: No deficits      

      noted.                                                                                      

                                                                                                  

Historical:                                                                                       

- Allergies:                                                                                      

17:09 Ibuprofen;                                                                              iw  

- Home Meds:                                                                                      

17:09 losartan-hydrochlorothiazide 100-25 mg Oral tab 1 tab once daily [Active];              iw  

- PMHx:                                                                                           

17:09 Hypertensive disorder;                                                                  iw  

                                                                                                  

                                                                                                  

                                                                                                  

Screenin:30 [facility] ED Fall Risk Assessment (Adult) History of falling in the last 3 months,       vc1 

      including since admission No falls in past 3 months (0 pts) Confusion or Disorientation     

      No (0 pts) Intoxicated or Sedated No (0 pts) Impaired Gait No (0 pts) Mobility Assist       

      Device Used No (0 pt) Altered Elimination No (0 pt). Abuse screen: Denies threats or        

      abuse. Nutritional screening: No deficits noted. Tuberculosis screening: No symptoms or     

      risk factors identified.                                                                    

                                                                                                  

Assessment:                                                                                       

19:00 Reassessment: See triage assessment.                                                    vc1 

19:00 GI: Abdomen is round non-distended.                                                     vc1 

20:00 Reassessment: Patient and/or family updated on plan of care and expected duration. Pain vc1 

      level reassessed. Patient states symptoms have improved.                                    

21:00 Reassessment: Patient and/or family updated on plan of care and expected duration. Pain vc1 

      level reassessed. Patient states feeling better. GI: Reports no longer feels and pain       

      or nausea.                                                                                  

22:00 Reassessment: Patient and/or family updated on plan of care and expected duration. Pain vc1 

      level reassessed. Patient states feeling better. Patient states symptoms have improved.     

23:00 Reassessment: No changes from previously documented assessment. Patient and/or family   vc1 

      updated on plan of care and expected duration. Pain level reassessed.                       

                                                                                             

00:00 Reassessment: No changes from previously documented assessment. Patient and/or family   vc1 

      updated on plan of care and expected duration. Pain level reassessed.                       

                                                                                                  

Vital Signs:                                                                                      

                                                                                             

17:07  / 52; Pulse 92; Resp 16; Temp 98.4(O); Pulse Ox 100% on R/A; Weight 69.85 kg;    iw  

      Height 5 ft. 1 in. (154.94 cm); Pain 5/10;                                                  

21:00  / 58; Pulse 81; Resp 22; Pulse Ox 10% ;                                          vc1 

22:15  / 46; Pulse 88; Resp 17; Pulse Ox 100% ;                                         vc1 

23:30  / 75; Pulse 87; Resp 22; Pulse Ox 98% on R/A;                                    vc1 

17:07 Body Mass Index 29.10 (69.85 kg, 154.94 cm)                                             iw  

                                                                                                  

ED Course:                                                                                        

16:32 Patient arrived in ED.                                                                  mr  

16:50 [facility]                                                          snw 

16:50 Juni Qureshi MD is Attending Physician.                                              snw 

17:09 Triage completed.                                                                       iw  

17:09 Arm band placed on.                                                                     iw  

18:01 CT Stone Protocol In Process Unspecified.                                               EDMS

19:11 Jen Hayes, ARACELIS is Primary Nurse.                                                     5 

19:12 Primary Nurse role handed off by Jen Hayes, RN                                      vc1 

19:12 Fátima Alves RN is Primary Nurse.                                                  vc1 

19:43 Notified ED physician of a critical lab result(s). potassium 2.5 chloride 84.           5 

19:57 Pelon Gutierrez MD is Hospitalizing Provider.                                           snw 

19:58 Dani Thompson MD is Hospitalizing Provider.                                            w 

                                                                                             

00:10 No provider procedures requiring assistance completed. Patient admitted, IV remains in  vc1 

      place.                                                                                      

                                                                                                  

Administered Medications:                                                                         

                                                                                             

17:51 Drug: Phenergan (promethazine) 25 mg Route: PO;                                         5 

18:55 Drug: NS 0.9% 500 ml Route: IV; Rate: bolus; Site: left antecubital;                    [facility] 

20:00 Follow up: IV Status: Completed infusion; IV Intake: 500ml                              vc1 

19:59 CANCELLED (Other Intervention Used): NS 0.9% 1000 ml IV at 125 ml/hr continuous         la1 

20:15 Drug: fentaNYL (PF) 25 mcg Route: IVP; Site: left antecubital;                          [facility] 

22:00 Follow up: Response: No adverse reaction; Marked relief of symptoms; Pain is decreased  vc1 

20:15 Drug: Potassium Chloride 20 mEq Route: IV; Rate: calculated rate; Site: left            [facility] 

      antecubital;                                                                                

22:15 Follow up: Response: No adverse reaction; IV Status: Completed infusion; IV Intake:     vc1 

      100ml                                                                                       

20:15 Drug: Potassium Chloride 40 mEq Route: PO;                                              5 

                                                                                             

00:20 Follow up: Response: No adverse reaction; No change in condition                        1 

                                                                                             

20:19 CANCELLED (we no have): Erythrocin (ERYTHromycin) 500 mg PO once                        5 

20:40 Drug: Ambien (zolpidem) 5 mg Route: PO;                                                 vc1 

                                                                                             

00:19 Follow up: Response: No adverse reaction                                                [facility] 

                                                                                             

21:33 Drug: NS 0.9% 250 ml Route: IV; Rate: calculated rate; Site: left antecubital;          jb4 

                                                                                             

00:19 Follow up: IV Status: Completed infusion; IV Intake: 500ml                              vc1 

                                                                                             

22:11 Drug: Ambien (zolpidem) 5 mg Route: PO;                                                 vc1 

23:00 Follow up: Response: No adverse reaction                                                vc1 

22:11 Drug: ProTONIX (pantoprazole) 40 mg Route: IVP; Site: left antecubital;                 vc1 

23:00 Follow up: Response: No adverse reaction; Marked relief of symptoms                     vc1 

22:51 Drug: Potassium Chloride 20 mEq Route: IV; Rate: calculated rate; Site: left            vc1 

      antecubital;                                                                                

                                                                                             

00:19 Follow up: IV Status: Infusion continued upon admission                                 vc1 

                                                                                                  

                                                                                                  

Medication:                                                                                       

00:10 VIS not applicable for this client.                                                     vc1 

                                                                                                  

Intake:                                                                                           

                                                                                             

20:00 IV: 500ml; Total: 500ml.                                                                vc1 

22:15 IV: 100ml; Total: 600ml.                                                                vc1 

                                                                                             

00:19 IV: 500ml; Total: 1100ml.                                                               vc1 

                                                                                                  

Outcome:                                                                                          

                                                                                             

19:58 Decision to Hospitalize by Provider.                                                    snw 

                                                                                             

00:10 Admitted to Med/surg accompanied by tech, via wheelchair, room 209, Report called to    [facility] 

      Shweta                                                                                   

      Instructed on the need for admit.                                                           

00:17 Condition: improved                                                                     vc1 

00:18 Patient left the ED.                                                                    vc1 

                                                                                                  

Signatures:                                                                                       

Dispatcher [employer]                           EDMS                                                 

Joann Ruiz FNP-C                   FNP-Csnw                                                  

Puja Echeverria Irene, RN RN iw Bryson, James, RN RN   jb4                                                  

Jen Hayes RN RN   jh5                                                  

Fátima Alves RN RN   vc1                                                  

Benjie Garcia                             FNP-Cla1                                                  

                                                                                                  

Corrections: (The following items were deleted from the chart)                                    

                                                                                             

17:20 17:07  / 52; Pulse 92bpm; Resp 16bpm; Pulse Ox 100% RA; 69.85 kg; Height 5 ft. 1  iw  

      in.; BMI: 29.1; Pain 5/10; iw                                                               

                                                                                                  

**************************************************************************************************

## 2023-01-19 LAB
BUN BLD-MCNC: 10 MG/DL (ref 7–18)
BUN BLD-MCNC: 12 MG/DL (ref 7–18)
BUN BLD-MCNC: 13 MG/DL (ref 7–18)
GLUCOSE SERPLBLD-MCNC: 102 MG/DL (ref 74–106)
GLUCOSE SERPLBLD-MCNC: 106 MG/DL (ref 74–106)
GLUCOSE SERPLBLD-MCNC: 108 MG/DL (ref 74–106)
GLUCOSE SERPLBLD-MCNC: 113 MG/DL (ref 74–106)
GLUCOSE SERPLBLD-MCNC: 117 MG/DL (ref 74–106)
GLUCOSE SERPLBLD-MCNC: 124 MG/DL (ref 74–106)
HCT VFR BLD CALC: 35.3 % (ref 36–45)
LYMPHOCYTES # SPEC AUTO: 2.2 K/UL (ref 0.7–4.9)
MCV RBC: 78.3 FL (ref 80–100)
PMV BLD: 8.7 FL (ref 7.6–11.3)
POTASSIUM SERPL-SCNC: 3.1 MMOL/L (ref 3.5–5.1)
POTASSIUM SERPL-SCNC: 3.2 MMOL/L (ref 3.5–5.1)
POTASSIUM SERPL-SCNC: 3.2 MMOL/L (ref 3.5–5.1)
POTASSIUM SERPL-SCNC: 3.3 MMOL/L (ref 3.5–5.1)
POTASSIUM SERPL-SCNC: 4 MMOL/L (ref 3.5–5.1)
POTASSIUM SERPL-SCNC: 4.3 MMOL/L (ref 3.5–5.1)
POTASSIUM UR-SCNC: 19 MMOL/L (ref 20–40)
RBC # BLD: 4.51 M/UL (ref 3.86–4.86)
SODIUM UR-SCNC: 38 MMOL/L (ref 27–287)

## 2023-01-19 RX ADMIN — ENOXAPARIN SODIUM SCH MG: 40 INJECTION SUBCUTANEOUS at 09:03

## 2023-01-19 RX ADMIN — SODIUM CHLORIDE SCH MG: 0.9 INJECTION, SOLUTION INTRAVENOUS at 20:43

## 2023-01-19 RX ADMIN — SODIUM CHLORIDE SCH MG: 0.9 INJECTION, SOLUTION INTRAVENOUS at 09:03

## 2023-01-19 RX ADMIN — Medication SCH ML: at 09:04

## 2023-01-19 RX ADMIN — Medication SCH ML: at 20:44

## 2023-01-19 NOTE — P.PN
Subjective


Date of Service: 01/19/23


Chief Complaint: Hyponatremia, hypokalemia


No acute events overnight. She states that her nausea/vomiting have improved 

significantly. She states that she was scheduled to have her colonoscopy on 

01/12/2023, but it was unable to be completed due to a poor bowel preparation. 

She reports having significant vomiting since that procedure.





Review of Systems


10-point ROS is otherwise unremarkable


Gastrointestinal: Nausea, Vomiting





Physical Examination





- Vital Signs


Temperature: 97.5 F


Blood Pressure: 140/66


Pulse: 78


Respirations: 16


Pulse Ox (%): 100





- Physical Exam


General: Alert, In no apparent distress, Oriented x3


HEENT: Atraumatic, Mucous membr. moist/pink, EOMI, Sclerae nonicteric


Neck: JVD not distended


Respiratory: Clear to auscultation bilaterally, Normal air movement


Cardiovascular: No edema, Regular rate/rhythm, Normal S1 S2, No gallops, No 

rubs, No murmurs


Gastrointestinal: Soft and benign, Non-distended, No tenderness, No rebound, No 

guarding, Hyperactive


Musculoskeletal: No clubbing


Integumentary: No rashes


Neurological: Normal speech, Normal affect





- Studies


Laboratory Data (last 24 hrs)





01/18/23 19:14: Sodium 122 L, Potassium 2.5 L*, BUN 12, Creatinine 1.05 H, 

Glucose 114 H, Total Bilirubin 0.8, AST 25, ALT 18, Alkaline Phosphatase 91, 

Lipase 267


01/18/23 18:52: Magnesium 1.8


01/18/23 18:52: WBC 15.60 H, Hgb 13.3, Hct 40.7, Plt Count 325








Assessment And Plan





- Plan


# Hypovolemic Hyponatremia likely Multifactorial (Intractable Nausea/Vomiting, 

Bowel Preparation, Thiazide Use)


# Hypokalemia due to above


# History of Peptic Ulcer Disease


# Cholelithiasis


# Small-Moderate Hiatal Hernia


- Consulted Nephrology and spoke with Dr. Hutchison - recommendations appreciated


   - Gentle IV hydration


- Hold hydrochlorothiazide


- Advance diet as tolerated


- Continue pantoprazole


- PRN ondansetron, metoclopromide, dicyclomine





# Hypertension


- Hold home home losartan-hydrochlorothiazide


- PRN hydralazine





# Question of Proximal Sigmoid Colonic Mass (4 cm)


- CT abdomen = "negative for a genitourinary calculus. Cholelithiasis without 

evidence of cholecystitis. The inflammation surrounding the proximal sigmoid 

colon has diminished since the prior exam. Possible 


mass within the proximal sigmoid colon is again demonstrated. 


- Spoke with Dr. Krishna - he stated that on her prior colonoscopy, he was able

to visualize the sigmoid lesion. He believes that it represents chronic 

inflammation from diverticulitis rather than a colonic mass. However, he was 

unable to visualize any part of the colon past the sigmoid colon. For that 

reason, he has scheduled her for repeat colonoscopy on 01/30/2023.


   - He stated that she can be advanced to a regular diet and start clear 

liquids on 01/28/2023 as an outpatient





# Left Renal Cyst (4.1 cm)


- Follow-up with PCP








Dani Thompson M.D.

## 2023-01-19 NOTE — P.CNS
Date of Consult: 01/19/23


Reason for Consult: Hyponatremia


Requesting Physician: Dani Thompson


Chief Complaint: Hyponatremia, hypokalemia


History of Present Illness: 





67-year-old female with history of hypertension, recently diagnosed proximal 

sigmoid colonic mass, GERD presents to the emergency department for intractable 

nausea and vomiting.  She reports that she had a colonoscopy performed on 

1/12/2023, unfortunately as procedure they were unable to fully visualize the 

mass to evaluate, since then patient has had intermittent nausea, vomiting.  She

also takes losartan/hydrochlorothiazide at home, she was evaluated in the 

emergency department found to have hyponatremia with a sodium of 122 potassium 

of 2.5 chloride 84 leukocytosis with a white blood cell count of 15.6, no source

of infection identified.  In the emergency department patient was given 500 cc 

NS bolus as well as 40 of IV potassium and 40 of p.o. potassium.  Will admit for

further valuation and management of hyponatremia, hypokalemia, intractable 

nausea/vomiting.





18:35 This 67 yrs old Black Female presents to ER via Wheelchair with complaints

of snw 


 Nausea/Vomiting. 


18:35 The patient presents to the emergency department with nausea, vomiting. 

Onset: The snw 


 symptoms/episode began/occurred 2 month(s) ago, and became persistent. The 

symptoms are 


 aggravated by food . Severity of symptoms: At their worst the symptoms were 

moderate. 


 The patient has experienced similar episodes in the past, multiple times. as 

noted. 


Allergies





NSAIDS (Non-Steroidal Anti-Inflamma Adverse Reaction (Verified 01/19/23 01:57)


   bleeding





Home medications list reviewed: Yes


Home Medications: 








Losartan/Hydrochlorothiazide [Losartan-Hctz 100-25 mg Tab] 1 tab PO DAILY 

12/18/22 


Ondansetron [Zofran (Odt)*] 4 mg PO Q8HP PRN 7 Days #20 tab 12/22/22 


Promethazine Tab [Phenergan] 12.5 mg PO Q6HP PRN 7 Days #15 tab 12/22/22 


Zolpidem Tartrate [Ambien] 10 mg PO BEDTIME 01/19/23 








- Past Medical/Surgical History


Diabetic: No


-: Hypertension


-: Sigmoid mass


-: Hyponatremia episode (Dr. Hutchison)


-: left knee sx


Psychosocial/ Personal History: Patient lives at home with her family.





- Family History


  ** Mother


Medical History: Cancer


Notes: Colon Cancer





- Social History


Alcohol use: No


CD- Drugs: No


Caffeine use: No


Place of Residence: Home





Review of Systems


10-point ROS is otherwise unremarkable


Gastrointestinal: Nausea


Neurological: Weakness





Physical Examination














Temp Pulse Resp BP Pulse Ox


 


 97.8 F   80   16   104/48 L  98 


 


 01/19/23 08:00  01/19/23 08:00  01/19/23 08:00  01/19/23 08:00  01/19/23 08:00








General: In no apparent distress, Oriented x3, Cooperative


HEENT: Atraumatic


Neck: Supple


Respiratory: Clear to auscultation bilaterally


Cardiovascular: No edema, Regular rate/rhythm


Gastrointestinal: Soft and benign, Non-distended


Musculoskeletal: No clubbing, No contractures


Integumentary: No rashes, No cyanosis


Neurological: Normal speech


Laboratory Data (last 24 hrs)





01/18/23 19:14: Sodium 122 L, Potassium 2.5 L*, BUN 12, Creatinine 1.05 H, 

Glucose 114 H, Total Bilirubin 0.8, AST 25, ALT 18, Alkaline Phosphatase 91, 

Lipase 267


01/18/23 18:52: Magnesium 1.8


01/18/23 18:52: WBC 15.60 H, Hgb 13.3, Hct 40.7, Plt Count 325





Imagings Data: 


EXAM DESCRIPTION: CT - Stone Protocol - 1/18/2023 5:59 pm


CLINICAL HISTORY: Abdominal pain.


COMPARISON: December 2022


TECHNIQUE: Computed axial tomography of the abdomen pelvis was obtained without 

oral or IV 


contrast. Lack of IV and oral contrast limits evaluation of solid organs, 

appendix, bowel, and vessels. 


Coronal reformatted images were obtained and reviewed.


All CT scans are performed using dose optimization technique as appropriate and 

may include automated 


exposure control or mA/KV adjustment according to patient size.


FINDINGS: A renal calculus is not seen. An ureteral calculus is not noted. A 

bladder calculus is not 


present. 4 centimeter left parapelvic renal cyst. No hydronephrosis


The liver, spleen, pancreas and adrenals appear grossly normal


There is no evidence of diverticulitis. The appendix appears normal


Calcified uterine fibroids.


Cholelithiasis without gallbladder wall thickening. Small umbilical hernia. 

Small to moderate hiatal hernia


Diverticula stem from the colon. The stranding adjacent to the proximal sigmoid 

colon has diminished. 


Possible mass is again demonstrated


IMPRESSION: Negative for a genitourinary calculus


Cholelithiasis without evidence of cholecystitis


The inflammation surrounding the proximal sigmoid colon has diminished since the

 prior exam. Possible 


mass within the proximal sigmoid colon is again demonstrated


Conclusions/Impression: 





Hyponatremia in the setting of HCTZ and N/V/anorexia


-Hold HCTZ


-Sodium correction within limits at this time; advance liquid diet to maintain 

appropriate correction rate


-BMP q4h





Hypokalemia in the setting of HCTZ and N/V/anorexia


-Replete as ordered


-Continue to monitor





HTN


-Hold antihypertensives at this time





Anemia in chronic illness


Microcytosis


-Monitor H&H


-Check iron status








Thank you kindly for the consultation

## 2023-01-20 VITALS — TEMPERATURE: 97.2 F | DIASTOLIC BLOOD PRESSURE: 61 MMHG | SYSTOLIC BLOOD PRESSURE: 137 MMHG

## 2023-01-20 VITALS — OXYGEN SATURATION: 97 %

## 2023-01-20 LAB
ALBUMIN SERPL BCP-MCNC: 2.8 G/DL (ref 3.4–5)
ALP SERPL-CCNC: 69 U/L (ref 45–117)
ALT SERPL W P-5'-P-CCNC: 13 U/L (ref 13–56)
AST SERPL W P-5'-P-CCNC: 18 U/L (ref 15–37)
BUN BLD-MCNC: 9 MG/DL (ref 7–18)
GLUCOSE SERPLBLD-MCNC: 101 MG/DL (ref 74–106)
HCT VFR BLD CALC: 34.3 % (ref 36–45)
LYMPHOCYTES # SPEC AUTO: 2 K/UL (ref 0.7–4.9)
MAGNESIUM SERPL-MCNC: 2 MG/DL (ref 1.6–2.4)
MCV RBC: 78.7 FL (ref 80–100)
PMV BLD: 8.7 FL (ref 7.6–11.3)
POTASSIUM SERPL-SCNC: 4.3 MMOL/L (ref 3.5–5.1)
RBC # BLD: 4.36 M/UL (ref 3.86–4.86)
URATE SERPL-MCNC: 4.2 MG/DL (ref 2.6–6)

## 2023-01-20 RX ADMIN — SODIUM CHLORIDE SCH MG: 0.9 INJECTION, SOLUTION INTRAVENOUS at 08:41

## 2023-01-20 RX ADMIN — Medication SCH ML: at 08:41

## 2023-01-20 RX ADMIN — ENOXAPARIN SODIUM SCH MG: 40 INJECTION SUBCUTANEOUS at 08:41

## 2023-01-20 NOTE — P.DS
Admission Date: 01/18/23


Discharge Date: 01/20/23


Disposition: ROUTINE DISCHARGE


Discharge Condition: GOOD


Reason for Admission: Hyponatremia, hypokalemia


Consultations: 


1. Nephrology


Hospital Course: 





DIAGNOSES:


# Hypovolemic Hyponatremia likely Multifactorial (Intractable Nausea/Vomiting, 

Bowel Preparation, Thiazide Use)


# Hypokalemia due to above


# Suspect Reactive Leukocytosis due to Nausea/Vomiting


# History of Peptic Ulcer Disease


# Cholelithiasis


# Small-Moderate Hiatal Hernia


# Hypertension


# Question of Proximal Sigmoid Colonic Mass (4 cm)


# Left Renal Cyst (4.1 cm)





HOSPITAL COURSE:


Ms. Shawnee Hall is a pleasant 67-year-old female with a past medical history

significant for peptic ulcer disease and hypertension who was admitted to the [facility] on 01/18/2023 for intractable nausea/vomiting.





She was admitted to the Medicine service. Upon further evaluation, she was found

to have hyponatremia and hypokalemia. Her electrolyte derangements were thought 

to be multifactorial in nature secondary to intractable nausea/vomiting, with 

recent bowel preparation, and thiazide use. Nephrology was consulted and she was

evaluated by Dr. Hutchison. Her hydrochlorothiazide was held and she was started 

on IV fluids, with gradual improvement of her electrolytes and symptoms. Her 

diet was advanced, and today, she was able to tolerate a regular diet, without 

any issues. During her evaluation, a CT abdomen was obtained and revealed, 

"negative for a genitourinary calculus. Cholelithiasis without evidence of 

cholecystitis. The inflammation surrounding the proximal sigmoid colon has 

diminished since the prior exam. Possible mass within the proximal sigmoid colon

is again demonstrated."  In regards to her colon lesion, I spoke with Dr. Krishna. He stated that on her prior colonoscopy, he was able to visualize the 

sigmoid lesion. He believes that it represents chronic inflammation from 

diverticulitis rather than a colonic mass. However, he was unable to visualize 

any part of the colon past the sigmoid colon. For that reason, he has scheduled 

her for repeat colonoscopy on 01/30/2023. He stated that she can be advanced to 

a regular diet and start clear liquids on 01/28/2023 as an outpatient. Her 

hydrochlorothiazide was discontinued at discharge.





In regards to her leukocytosis, this was thought to be reactive due to 

nausea/vomiting. She does not appear to have in any infections clinically. 

Additionally, her leukocytosis improved without any antibiotics.





On 01/20/2023, she was seen on morning rounds and deemed medically stable for 

discharge. She was discharged with instructions to schedule follow-up 

appointments with her PCP (GINA Sousa) and with Gastroenterology (Dr. Krishna). 

She was provided a prescription for ondansetron. She was given the opportunity 

to ask questions and reported no further questions. Furthermore, all questions 

were answered to the best of my ability.





A copy of this discharge summary will be sent to the above providers to 

facilitate continuity of care.





Today, I personally spent 20 minutes on her case, of which greater than 50% of 

the time was spent in patient education, counseling, and coordination of care as

described above.








- Physical Exam


General: Alert, In no apparent distress, Oriented x3


HEENT: Atraumatic, Mucous membr. moist/pink, EOMI, Sclerae nonicteric


Neck: JVD not distended


Respiratory: Clear to auscultation bilaterally, Normal air movement


Cardiovascular: No edema, Regular rate/rhythm, Normal S1 S2, No gallops, No 

rubs, No murmurs


Gastrointestinal: Soft and benign, Non-distended, No tenderness, No rebound, No 

guarding, Hyperactive


Musculoskeletal: No clubbing


Integumentary: No rashes


Neurological: Normal speech, Normal affect





Vital Signs/Physical Exam: 














Temp Pulse Resp BP Pulse Ox


 


 97.2 F   80   16   137/61  98 


 


 01/20/23 12:00  01/20/23 12:00  01/20/23 12:00  01/20/23 12:00  01/20/23 12:00








Laboratory Data at Discharge: 














WBC  11.40 K/uL (4.3-10.9)  H  01/20/23  06:17    


 


Hgb  11.1 g/dL (12.0-15.0)  L  01/20/23  06:17    


 


Hct  34.3 % (36.0-45.0)  L  01/20/23  06:17    


 


Plt Count  267 K/uL (152-406)   01/20/23  06:17    


 


Sodium  131 mmol/L (136-145)  L  01/20/23  06:17    


 


Potassium  4.3 mmol/L (3.5-5.1)   01/20/23  06:17    


 


BUN  9 mg/dL (7-18)   01/20/23  06:17    


 


Creatinine  0.76 mg/dL (0.55-1.02)   01/20/23  06:17    


 


Glucose  101 mg/dL ()   01/20/23  06:17    


 


Uric Acid  4.2 mg/dL (2.6-6.0)   01/20/23  06:17    


 


Phosphorus  1.9 mg/dL (2.5-4.9)  L  01/20/23  06:17    


 


Magnesium  2.0 mg/dL (1.6-2.4)   01/20/23  06:17    


 


Total Bilirubin  0.5 mg/dL (0.2-1.0)   01/20/23  06:17    


 


AST  18 U/L (15-37)   01/20/23  06:17    


 


ALT  13 U/L (13-56)   01/20/23  06:17    


 


Alkaline Phosphatase  69 U/L ()   01/20/23  06:17    


 


Lipase  267 U/L ()   01/18/23  19:14    








Home Medications: 








RX: Promethazine Tab [Phenergan*] 12.5 mg PO Q6HP PRN 7 Days #15 tab 12/22/22 


RX: Zolpidem Tartrate [Ambien*] 10 mg PO BEDTIME 01/19/23 


RX: Ondansetron [Zofran (Odt)*] 4 mg PO Q8HP PRN 7 Days #20 tab 01/20/23 





New Medications: 


RX: Ondansetron [Zofran (Odt)*] 4 mg PO Q8HP PRN 7 Days #20 tab


 PRN Reason: Nausea / Vomiting


Physician Discharge Instructions: 


1. Please call and schedule a follow-up appointment with your PCP (GINA Sousa) in 

3-5 days


2. Please attend your colonoscopy appointment with Dr. Krishna in 01/30/2023


   - Please start a clear liquid diet on 01/28/2023


Diet: Regular


Activity: Ad arslan


Followup: 


Roque Sousa FNP [Primary Care Provider] - 


James Krishna MD [ACTIVE - CAN ADMIT] - 


Time spent managing pt's care (in minutes): 20

## 2023-01-20 NOTE — P.PN
Neohrology note


(S) Pt reports feeling better, no current N/V/abd pain, BP acceptable





General: In no apparent distress, Oriented x3, Cooperative


HEENT: Atraumatic, sclera anicteric


Neck: Supple


Respiratory: Clear to auscultation bilaterally


Cardiovascular: No edema, Regular rate/rhythm


Gastrointestinal: Soft and benign, Non-distended


Musculoskeletal: No clubbing, No contractures


Integumentary: No rashes, No cyanosis


Neurological: Normal speech, alert, non focal


Laboratory Data (last 24 hrs)





Reviewed in the EMR





A/P)








Hyponatremia in the setting of HCTZ and hypovolemia


-Na level improving with isotonic IVF hydration, cont to hold HCTZ





Hypokalemia in the setting of GI losses and chronic thiazide diuretic use 

leading to total body K depletion


-Resolved, cont to hold Losartan/HCTZ at this time





Essential HTN


-BP non elevated at this time, recommend pt monitor BP off ARB/HCTZ and if BP 

trends up moderately then lower dose ARB can be resumed

## 2024-08-19 ENCOUNTER — HOSPITAL ENCOUNTER (EMERGENCY)
Dept: HOSPITAL 97 - ER | Age: 69
Discharge: HOME | End: 2024-08-19
Payer: COMMERCIAL

## 2024-08-19 VITALS — SYSTOLIC BLOOD PRESSURE: 112 MMHG | DIASTOLIC BLOOD PRESSURE: 44 MMHG | TEMPERATURE: 98.3 F | OXYGEN SATURATION: 99 %

## 2024-08-19 DIAGNOSIS — R11.2: ICD-10-CM

## 2024-08-19 DIAGNOSIS — R10.11: Primary | ICD-10-CM

## 2024-08-19 DIAGNOSIS — R19.7: ICD-10-CM

## 2024-08-19 LAB
ALBUMIN SERPL BCP-MCNC: 2.9 G/DL (ref 3.4–5)
ALBUMIN/GLOB SERPL: 0.5 {RATIO} (ref 1.1–1.8)
ALP SERPL-CCNC: 109 U/L (ref 45–117)
ALT SERPL W P-5'-P-CCNC: 22 U/L (ref 13–56)
ANION GAP SERPL CALC-SCNC: 11.6 MEQ/L (ref 5–15)
AST SERPL W P-5'-P-CCNC: 21 U/L (ref 15–37)
BUN BLD-MCNC: 13 MG/DL (ref 7–18)
GLOBULIN SER CALC-MCNC: 5.3 G/DL (ref 2.3–3.5)
GLUCOSE SERPLBLD-MCNC: 136 MG/DL (ref 74–106)
HCT VFR BLD CALC: 34.4 % (ref 36–45)
HGB BLD-MCNC: 10.9 G/DL (ref 12–15)
LYMPHOCYTES # SPEC AUTO: 1.4 K/UL (ref 0.7–4.9)
MCH RBC QN AUTO: 24.6 PG (ref 27–35)
MCHC RBC AUTO-ENTMCNC: 31.6 G/DL (ref 32–36)
MCV RBC: 78 FL (ref 80–100)
MORPHOLOGY BLD-IMP: (no result)
NEUTROPHILS NFR FLD MANUAL: 59 % (ref 40–80)
NRBC # BLD: 0 10*3/UL (ref 0–0)
NRBC BLD AUTO-RTO: 0 % (ref 0–0)
PMV BLD: 8.6 FL (ref 7.6–11.3)
POTASSIUM SERPL-SCNC: 3.6 MEQ/L (ref 3.5–5.1)
RBC # BLD: 4.41 M/UL (ref 3.86–4.86)
TOTAL CELLS COUNTED SPEC: 100
WBC # BLD AUTO: 15.5 THOU/UL (ref 4.3–10.9)

## 2024-08-19 PROCEDURE — 74177 CT ABD & PELVIS W/CONTRAST: CPT

## 2024-08-19 PROCEDURE — 36415 COLL VENOUS BLD VENIPUNCTURE: CPT

## 2024-08-19 PROCEDURE — 85025 COMPLETE CBC W/AUTO DIFF WBC: CPT

## 2024-08-19 PROCEDURE — 80053 COMPREHEN METABOLIC PANEL: CPT

## 2024-08-19 NOTE — ER
Nurse's Notes                                                                                     

 [facility]                                                                 

Name: Shawnee Hall                                                                             

Age: 69 yrs                                                                                       

Sex: Female                                                                                       

: 1955                                                                                   

MRN: [id number]                                                                                   

Arrival Date: 2024                                                                          

Time: 13:02                                                                                       

Account#: [id number]                                                                            

Bed 4                                                                                             

Private MD:                                                                                       

Diagnosis: Abdominal pain, unspecified;Nausea with vomiting, unspecified;Diarrhea, unspecified    

                                                                                                  

Presentation:                                                                                     

                                                                                             

13:21 Chief complaint: Patient states: Friday ate a kolache and muffin and later started      tm6 

      feeling funny. Threw up and had yellow diarrhea. Continued to throw up and have             

      diarrhea Saturday and . Today had black diarrhea. Took pepto bismol. Ebola            

      Screen: Patient negative for fever greater than or equal to 101.5 degrees Fahrenheit,       

      and additional compatible Ebola Virus Disease symptoms Patient denies exposure to           

      infectious person. Patient denies travel to an Ebola-affected area in the 21 days           

      before illness onset. No symptoms or risks identified at this time. Risk Assessment: Do     

      you want to hurt yourself or someone else? Patient reports no desire to harm self or        

      others. Onset of symptoms was 2024.                                              

13:21 Method Of Arrival: Ambulatory                                                           tm6 

13:21 Acuity: JASON 3                                                                           tm6 

14:49 Coronavirus screen: Client denies travel out of the [place] in the last 14 days. Initial   go2 

      Sepsis Screen: Does the patient have a suspected source of infection?. Initial Sepsis       

      Screen: Does the patient meet any 2 criteria? No. Patient's initial sepsis screen is        

      negative.                                                                                   

                                                                                                  

Triage Assessment:                                                                                

13:21 General: Appears uncomfortable, Behavior is calm, cooperative. Pain: Complains of pain  tm6 

      in right upper quadrant and left upper quadrant Pain currently is 2 out of 10 on a pain     

      scale. Pain began 2-3 days ago. EENT: No signs and/or symptoms were reported regarding      

      the EENT system. Neuro: Level of Consciousness is awake, alert, obeys commands,             

      Oriented to person, place, time, situation. Cardiovascular: Patient's skin is warm and      

      dry. Respiratory: Airway is patent Respiratory effort is even, unlabored, Respiratory       

      pattern is regular, symmetrical. GI: Reports diarrhea, nausea, vomiting. : No signs       

      and/or symptoms were reported regarding the genitourinary system. Derm: No signs and/or     

      symptoms reported regarding the dermatologic system. Musculoskeletal: No signs and/or       

      symptoms reported regarding the musculoskeletal system.                                     

                                                                                                  

Historical:                                                                                       

- Allergies:                                                                                      

13:24 Ibuprofen;                                                                              tm6 

- PMHx:                                                                                           

13:24 Hypertensive disorder;                                                                  tm6 

- PSHx:                                                                                           

13:24 hernia repair;                                                                          tm6 

                                                                                                  

- Immunization history:: Adult Immunizations.                                                     

- Infectious Disease History:: Denies.                                                            

- Social history:: Smoking status: Patient denies any tobacco usage or history of.                

                                                                                                  

                                                                                                  

Screenin:48 [facility] ED Fall Risk Assessment (Adult) History of falling in the last 3 months,       go2 

      including since admission No falls in past 3 months (0 pts) Confusion or Disorientation     

      No (0 pts) Intoxicated or Sedated No (0 pts) Impaired Gait No (0 pts) Mobility Assist       

      Device Used No (0 pt) Altered Elimination No (0 pt) Score/Fall Risk Level 0 - 2 = Low       

      Risk. Abuse screen: Denies threats or abuse. Denies injuries from another. Nutritional      

      screening: No deficits noted. Tuberculosis screening: No symptoms or risk factors           

      identified.                                                                                 

                                                                                                  

Assessment:                                                                                       

13:47 General: Appears uncomfortable, well groomed.                                           go2 

13:47 Pain: Complains of pain in RUQ/LUQ Pain does not radiate. Pain currently is 2 out of 10 go2 

      on a pain scale. Pain began 2-3 days ago. Neuro: No deficits noted. Cardiovascular: No      

      deficits noted. Respiratory: No deficits noted. GI: Abdomen is non-distended, Abd is        

      soft Abd is non tender Reports diarrhea, nausea. : No deficits noted.                     

                                                                                                  

Vital Signs:                                                                                      

13:20  / 63; Pulse 91; Temp 98.9(O); Weight 75.3 kg; Height 5 ft. 0 in. ;               tm6 

14:42  / 58; Pulse 70; Resp 16; Temp 98; Pulse Ox 97% ;                                 go2 

15:44  / 44; Pulse 79; Resp 16; Temp 98.3; Pulse Ox 99% ;                               go2 

13:20 Body Mass Index 32.42 (75.30 kg, 152.4 cm)                                              tm6 

                                                                                                  

ED Course:                                                                                        

13:08 Patient arrived in ED.                                                                  mr  

13:13 Tomy Grady DO is Attending Physician.                                                ms3 

13:23 Triage completed.                                                                       tm6 

13:31 Arm band placed on right wrist.                                                         tm6 

13:47 Rhiannon Angela, RN is Primary Nurse.                                                     go2 

14:20 Inserted saline lock: 20 gauge in right antecubital area, using aseptic technique.      go2 

14:49 Patient has correct armband on for positive identification. Allergy band placed. Bed in go2 

      low position. Call light in reach. Side rails up X2. Adult w/ patient. Provided             

      Education on: NA.                                                                           

14:50 Appears to be sleeping.                                                                 go2 

15:15 CT Abd/Pelvis - IV Contrast Only In Process Unspecified.                                EDMS

15:25 Served as a chaperone during rectal exam.                                               go2 

15:53 IV discontinued, intact, bleeding controlled, No redness/swelling at site. Pressure     go2 

      dressing applied.                                                                           

                                                                                                  

Administered Medications:                                                                         

14:12 Drug: Ondansetron IVP 4 mg IVP once; over 2 minutes Route: IVP; Site: right antecubital;dd2 

14:27 Follow up: Response: No adverse reaction                                                dd2 

14:12 Drug: NS 0.9% IV 1000 ml IV at 1 bolus Per protocol; 1000 mL bolus Route: IV; Rate: 1   dd2 

      bolus; Site: right antecubital;                                                             

14:27 Follow up: Response: No adverse reaction                                                dd2 

15:53 Follow up: IV Status: Completed infusion                                                go2 

                                                                                                  

                                                                                                  

Medication:                                                                                       

14:49 VIS not applicable for this client.                                                     go2 

                                                                                                  

Outcome:                                                                                          

15:43 Discharge ordered by MD.                                                                ms3 

15:54 Discharged to home ambulatory,                                                          go2 

15:54 Condition: good                                                                             

15:54 Discharge instructions given to patient, Instructed on discharge instructions, follow       

      up and referral plans. Demonstrated understanding of instructions, follow-up care,          

      medications, Prescriptions given X 1,                                                       

15:54 Patient left the ED.                                                                    go2 

                                                                                                  

Signatures:                                                                                       

Dispatcher [employer]                           EDMS                                                 

Puja Echeverria, Felix                       Reg  mr TaylorsTomy, DO                        DO   ms3                                                  

Melany Robin RN                   RN   tm6                                                  

SANDRA PAROSNS RN                        RN   dd2                                                  

Rhiannon Angela RN                       RN   go2                                                  

                                                                                                  

**************************************************************************************************

## 2024-08-19 NOTE — RAD REPORT
EXAM DESCRIPTION:  CT - Abdomen   Pelvis W Contrast - 8/19/2024 3:13 pm

 

CLINICAL HISTORY:  ABD PAIN

 

COMPARISON:  Abdomen   Pelvis W Contrast dated 12/17/2022; Abdomen   Pelvis W Contrast dated 12/3/202
2

 

TECHNIQUE:  Thin cut axial CT imaging of the abdomen and pelvis was performed following intravenous a
dministration of 100 mL Isovue 300. Multiplanar reformats were generated and reviewed.

 

All CT scans are performed using dose optimization technique as appropriate and may include automated
 exposure control or mA/KV adjustment according to patient size.

 

FINDINGS:  No suspicious findings in the lung bases.

 

The liver, spleen, adrenal glands, and pancreas show no suspicious findings. Gallbladder shows small 
stones layering near the fundus.

 

Symmetric renal function is seen with no hydronephrosis or suspicious renal mass. Parapelvic cyst flaquita
suring 4.2 cm is again seen

 

No dilated bowel loops or bowel wall thickening. No free air, free fluid or inflammatory stranding. S
mall umbilical hernia containing fat. No suspicious mass or bulky lymphadenopathy. Retroverted uterus
 with calcified fundal fibroid and another small calcified fibroid near the right cornu. The urinary 
bladder is without significant finding.

 

No suspicious bony findings.

 

 

IMPRESSION:  No acute intra-abdominal process.

 

Incidental findings as above.

## 2024-08-19 NOTE — EDPHYS
Physician Documentation                                                                           

 [facility]                                                                 

Name: Shawnee Hall                                                                             

Age: 69 yrs                                                                                       

Sex: Female                                                                                       

: 1955                                                                                   

MRN: [id number]                                                                                   

Arrival Date: 2024                                                                          

Time: 13:02                                                                                       

Account#: [id number]                                                                            

Bed 4                                                                                             

Private MD:                                                                                       

ED Physician Tomy Grady                                                                         

HPI:                                                                                              

                                                                                             

15:46 This 69 yrs old Black Female presents to ER via Ambulatory with complaints of           ms3 

      Black/Tarry Stools, Vomiting, Weakness.                                                     

17:39 69-year-old female with past medical history of hypertension presents to the emergency  ms3 

      department for nausea, vomiting, diarrhea that began on Friday after eating a collage.      

      Patient states she noted her stool to be black this morning after taking Pepto-Bismol.      

      Patient states the pain is located in her mid abdomen. She denies any alleviating or        

      inciting factors.                                                                           

                                                                                                  

Historical:                                                                                       

- Allergies:                                                                                      

13:24 Ibuprofen;                                                                              tm6 

- PMHx:                                                                                           

13:24 Hypertensive disorder;                                                                  tm6 

- PSHx:                                                                                           

13:24 hernia repair;                                                                          tm6 

                                                                                                  

- Immunization history:: Adult Immunizations.                                                     

- Infectious Disease History:: Denies.                                                            

- Social history:: Smoking status: Patient denies any tobacco usage or history of.                

                                                                                                  

                                                                                                  

ROS:                                                                                              

17:39 Constitutional: Negative for fever, and chills. Cardiovascular: Negative for chest      ms3 

      pain, and palpitations. Respiratory: Negative for shortness of breath, cough, wheezing,     

      and pleuritic chest pain,                                                                   

17:39 Abdomen/GI: Positive for abdominal pain, nausea, vomiting, and diarrhea,                    

17:39 All other systems are negative,                                                             

                                                                                                  

Exam:                                                                                             

17:39 Constitutional:  This is a well developed, well nourished patient who is awake, alert,  ms3 

      and in no acute distress. Chest/axilla:  Normal chest wall appearance and motion.           

      Nontender with no deformity.   Cardiovascular:  Regular rate and rhythm with a normal       

      S1 and S2.  No gallops, murmurs, or rubs.  Normal PMI, no JVD.  No pulse deficits.          

      Respiratory:  Lungs have equal breath sounds bilaterally, clear to auscultation and         

      percussion.  No rales, rhonchi or wheezes noted.  No increased work of breathing, no        

      retractions or nasal flaring.                                                               

17:39 Abdomen/GI: Inspection: abdomen appears normal, Bowel sounds: normal, Palpation: mild       

      abdominal tenderness, in the right upper quadrant and left upper quadrant,                  

                                                                                                  

Vital Signs:                                                                                      

13:20  / 63; Pulse 91; Temp 98.9(O); Weight 75.3 kg; Height 5 ft. 0 in. ;               tm6 

14:42  / 58; Pulse 70; Resp 16; Temp 98; Pulse Ox 97% ;                                 go2 

15:44  / 44; Pulse 79; Resp 16; Temp 98.3; Pulse Ox 99% ;                               go2 

13:20 Body Mass Index 32.42 (75.30 kg, 152.4 cm)                                              tm6 

                                                                                                  

MDM:                                                                                              

13:32 Patient medically screened.                                                             ms3 

17:39 Differential diagnosis: Nonspecific abd pain, gastritis, pancreatitis, diverticulitis,  ms3 

      viral gastroenteritis, gastroenteritis. Data reviewed: vital signs, nurses notes, lab       

      test result(s), radiologic studies, and as a result, I will discharge patient. I            

      considered the following discharge prescriptions or medication management in the            

      emergency department Medications were administered in the Emergency Department. See         

      MAR. Counseling: I had a detailed discussion with the patient and/or guardian regarding     

      the historical points, exam findings, and any diagnostic results supporting the             

      discharge/admit diagnosis, lab results, radiology results, the need for outpatient          

      follow up. Special discussion: Based on the patient's Hx, exam, and Dx evaluation,          

      there is no indication for emergent surgery or inpatient Tx. It is understood by the        

      patient/guardian that if the Sx's persist or worsen they need to return immediately for     

      re-evaluation. ED course: Discussed labs, CT findings with patient and her .         

      Patient to follow-up with primary care physician 2 to 3 days. Patient understands and       

      agrees with plan. All questions were answered. Return precautions discussed include         

      worsening symptoms, or any other concerns.                                                  

                                                                                                  

                                                                                             

13:31 Order name: CBC with Diff; Complete Time: 14:55                                         ms3 

                                                                                             

13:31 Order name: CMP; Complete Time: 14:55                                                   ms3 

                                                                                             

14:47 Order name: Manual Differential; Complete Time: 14:55                                   EDMS

                                                                                             

14:56 Order name: CT Abd/Pelvis - IV Contrast Only; Complete Time: 15:33                      ms3 

                                                                                                  

Administered Medications:                                                                         

14:12 Drug: Ondansetron IVP 4 mg IVP once; over 2 minutes Route: IVP; Site: right antecubital;dd2 

14:27 Follow up: Response: No adverse reaction                                                dd2 

14:12 Drug: NS 0.9% IV 1000 ml IV at 1 bolus Per protocol; 1000 mL bolus Route: IV; Rate: 1   dd2 

      bolus; Site: right antecubital;                                                             

14:27 Follow up: Response: No adverse reaction                                                dd2 

15:53 Follow up: IV Status: Completed infusion                                                go2 

                                                                                                  

                                                                                                  

Disposition Summary:                                                                              

24 15:43                                                                                    

Discharge Ordered                                                                                 

 Notes:       Location: Home                                                                        
  ms3

      Condition: Stable                                                                       ms3 

      Diagnosis                                                                                   

        - Abdominal pain, unspecified                                                         ms3 

        - Nausea with vomiting, unspecified                                                   ms3 

        - Diarrhea, unspecified                                                               ms3 

      Followup:                                                                               ms3 

        - With: Private Physician                                                                  

        - When: 2 - 3 days                                                                         

        - Reason: Recheck today's complaints                                                       

      Discharge Instructions:                                                                     

        - Discharge Summary Sheet                                                             ms3 

        - Abdominal Pain, Adult                                                               ms3 

        - Diarrhea, Adult                                                                     ms3 

        - Nausea and Vomiting, Adult                                                          ms3 

      Forms:                                                                                      

        - Medication Reconciliation Form                                                      ms3 

        - Antibiotic Education                                                                ms3 

        - Prescription Opioid Use                                                             ms3 

        - Patient Portal Instructions                                                         ms3 

        - Leadership Thank You Letter                                                         ms3 

      Prescriptions:                                                                              

        - ondansetron 4 mg Oral Tablet,disintegrating                                              

            - take 1 tablet ORAL route every 8 hours for 5 days as needed for nausea and      ms3 

      vomiting; 15 tablet; Refills: 0, Product Selection Permitted                                

Signatures:                                                                                       

Dispatcher [employer]                           EDMS                                                 

Tomy Grady,                         DO   ms3                                                  

Melany Robin RN                   RN   tm6                                                  

SANDRA PARSONS RN                        RN   dd2                                                  

Rhiannon Angela RN                       RN   go2                                                  

                                                                                                  

Corrections: (The following items were deleted from the chart)                                    

13:31 13:31 CBC+H.LAB.BRZ ordered. EDMS                                                       EDMS

13:31 13:31 COMPREHENSIVE METABOLIC PANEL+C.LAB.BRZ ordered. EDMS                             EDMS

14:47 14:23 CBC Smear Scan ordered. EDMS                                                      EDMS

14:57 14:57 Abdomen Pelvis W Con+CT.RAD.BRZ ordered. EDMS                                     EDMS

                                                                                                  

**************************************************************************************************